# Patient Record
Sex: MALE | Race: WHITE | NOT HISPANIC OR LATINO | Employment: OTHER
[De-identification: names, ages, dates, MRNs, and addresses within clinical notes are randomized per-mention and may not be internally consistent; named-entity substitution may affect disease eponyms.]

---

## 2017-05-10 ENCOUNTER — RECORDS - HEALTHEAST (OUTPATIENT)
Dept: ADMINISTRATIVE | Facility: OTHER | Age: 77
End: 2017-05-10

## 2017-06-09 ENCOUNTER — COMMUNICATION - HEALTHEAST (OUTPATIENT)
Dept: SCHEDULING | Facility: CLINIC | Age: 77
End: 2017-06-09

## 2017-06-09 DIAGNOSIS — E78.5 HYPERLIPIDEMIA: ICD-10-CM

## 2017-07-03 ENCOUNTER — COMMUNICATION - HEALTHEAST (OUTPATIENT)
Dept: INTERNAL MEDICINE | Facility: CLINIC | Age: 77
End: 2017-07-03

## 2017-07-03 DIAGNOSIS — I25.10 CAD (CORONARY ARTERY DISEASE): ICD-10-CM

## 2017-09-30 ENCOUNTER — COMMUNICATION - HEALTHEAST (OUTPATIENT)
Dept: INTERNAL MEDICINE | Facility: CLINIC | Age: 77
End: 2017-09-30

## 2017-09-30 DIAGNOSIS — I25.10 CAD (CORONARY ARTERY DISEASE): ICD-10-CM

## 2017-10-10 ENCOUNTER — OFFICE VISIT - HEALTHEAST (OUTPATIENT)
Dept: INTERNAL MEDICINE | Facility: CLINIC | Age: 77
End: 2017-10-10

## 2017-10-10 DIAGNOSIS — G25.81 RESTLESS LEGS SYNDROME (RLS): ICD-10-CM

## 2017-10-10 DIAGNOSIS — G20.A1 PARALYSIS AGITANS (H): ICD-10-CM

## 2017-10-10 DIAGNOSIS — Z00.00 HEALTHCARE MAINTENANCE: ICD-10-CM

## 2017-10-10 DIAGNOSIS — I25.10 ATHEROSCLEROSIS OF NATIVE CORONARY ARTERY OF NATIVE HEART WITHOUT ANGINA PECTORIS: ICD-10-CM

## 2017-10-10 LAB
CHOLEST SERPL-MCNC: 158 MG/DL
HDLC SERPL-MCNC: 59 MG/DL
LDLC SERPL CALC-MCNC: 74 MG/DL
TRIGL SERPL-MCNC: 123 MG/DL

## 2017-10-10 RX ORDER — ROPINIROLE 0.5 MG/1
0.5-1 TABLET, FILM COATED ORAL
Status: SHIPPED | COMMUNITY
Start: 2017-07-18 | End: 2024-02-21

## 2017-10-10 ASSESSMENT — MIFFLIN-ST. JEOR: SCORE: 1588.27

## 2017-11-27 ENCOUNTER — RECORDS - HEALTHEAST (OUTPATIENT)
Dept: ADMINISTRATIVE | Facility: OTHER | Age: 77
End: 2017-11-27

## 2017-12-30 ENCOUNTER — COMMUNICATION - HEALTHEAST (OUTPATIENT)
Dept: INTERNAL MEDICINE | Facility: CLINIC | Age: 77
End: 2017-12-30

## 2017-12-30 DIAGNOSIS — I25.10 CAD (CORONARY ARTERY DISEASE): ICD-10-CM

## 2018-04-02 ENCOUNTER — COMMUNICATION - HEALTHEAST (OUTPATIENT)
Dept: INTERNAL MEDICINE | Facility: CLINIC | Age: 78
End: 2018-04-02

## 2018-04-02 DIAGNOSIS — I25.10 CAD (CORONARY ARTERY DISEASE): ICD-10-CM

## 2018-07-06 ENCOUNTER — RECORDS - HEALTHEAST (OUTPATIENT)
Dept: ADMINISTRATIVE | Facility: OTHER | Age: 78
End: 2018-07-06

## 2018-07-16 ENCOUNTER — RECORDS - HEALTHEAST (OUTPATIENT)
Dept: ADMINISTRATIVE | Facility: OTHER | Age: 78
End: 2018-07-16

## 2018-08-26 ENCOUNTER — RECORDS - HEALTHEAST (OUTPATIENT)
Dept: ADMINISTRATIVE | Facility: OTHER | Age: 78
End: 2018-08-26

## 2018-08-28 ENCOUNTER — COMMUNICATION - HEALTHEAST (OUTPATIENT)
Dept: INTERNAL MEDICINE | Facility: CLINIC | Age: 78
End: 2018-08-28

## 2018-09-07 ENCOUNTER — COMMUNICATION - HEALTHEAST (OUTPATIENT)
Dept: SCHEDULING | Facility: CLINIC | Age: 78
End: 2018-09-07

## 2018-09-23 ENCOUNTER — COMMUNICATION - HEALTHEAST (OUTPATIENT)
Dept: INTERNAL MEDICINE | Facility: CLINIC | Age: 78
End: 2018-09-23

## 2018-09-23 DIAGNOSIS — I25.10 CAD (CORONARY ARTERY DISEASE): ICD-10-CM

## 2018-09-23 DIAGNOSIS — E78.5 HYPERLIPIDEMIA: ICD-10-CM

## 2018-10-28 ENCOUNTER — COMMUNICATION - HEALTHEAST (OUTPATIENT)
Dept: INTERNAL MEDICINE | Facility: CLINIC | Age: 78
End: 2018-10-28

## 2018-11-10 ENCOUNTER — RECORDS - HEALTHEAST (OUTPATIENT)
Dept: ADMINISTRATIVE | Facility: OTHER | Age: 78
End: 2018-11-10

## 2018-12-16 ENCOUNTER — COMMUNICATION - HEALTHEAST (OUTPATIENT)
Dept: INTERNAL MEDICINE | Facility: CLINIC | Age: 78
End: 2018-12-16

## 2018-12-17 ENCOUNTER — COMMUNICATION - HEALTHEAST (OUTPATIENT)
Dept: INTERNAL MEDICINE | Facility: CLINIC | Age: 78
End: 2018-12-17

## 2018-12-17 DIAGNOSIS — I25.10 CAD (CORONARY ARTERY DISEASE): ICD-10-CM

## 2018-12-30 ENCOUNTER — COMMUNICATION - HEALTHEAST (OUTPATIENT)
Dept: INTERNAL MEDICINE | Facility: CLINIC | Age: 78
End: 2018-12-30

## 2018-12-30 DIAGNOSIS — E78.5 HYPERLIPIDEMIA: ICD-10-CM

## 2019-01-20 ENCOUNTER — COMMUNICATION - HEALTHEAST (OUTPATIENT)
Dept: INTERNAL MEDICINE | Facility: CLINIC | Age: 79
End: 2019-01-20

## 2019-03-24 ENCOUNTER — COMMUNICATION - HEALTHEAST (OUTPATIENT)
Dept: INTERNAL MEDICINE | Facility: CLINIC | Age: 79
End: 2019-03-24

## 2019-03-24 DIAGNOSIS — I25.10 CAD (CORONARY ARTERY DISEASE): ICD-10-CM

## 2019-06-22 ENCOUNTER — COMMUNICATION - HEALTHEAST (OUTPATIENT)
Dept: INTERNAL MEDICINE | Facility: CLINIC | Age: 79
End: 2019-06-22

## 2019-06-22 DIAGNOSIS — I25.10 CAD (CORONARY ARTERY DISEASE): ICD-10-CM

## 2019-06-28 ENCOUNTER — COMMUNICATION - HEALTHEAST (OUTPATIENT)
Dept: INTERNAL MEDICINE | Facility: CLINIC | Age: 79
End: 2019-06-28

## 2019-06-28 DIAGNOSIS — I25.10 CAD (CORONARY ARTERY DISEASE): ICD-10-CM

## 2019-07-03 ENCOUNTER — RECORDS - HEALTHEAST (OUTPATIENT)
Dept: ADMINISTRATIVE | Facility: OTHER | Age: 79
End: 2019-07-03

## 2019-09-22 ENCOUNTER — COMMUNICATION - HEALTHEAST (OUTPATIENT)
Dept: INTERNAL MEDICINE | Facility: CLINIC | Age: 79
End: 2019-09-22

## 2019-09-22 DIAGNOSIS — I25.10 CAD (CORONARY ARTERY DISEASE): ICD-10-CM

## 2019-12-15 ENCOUNTER — COMMUNICATION - HEALTHEAST (OUTPATIENT)
Dept: INTERNAL MEDICINE | Facility: CLINIC | Age: 79
End: 2019-12-15

## 2019-12-15 DIAGNOSIS — E78.5 HYPERLIPIDEMIA: ICD-10-CM

## 2019-12-15 DIAGNOSIS — I25.10 CAD (CORONARY ARTERY DISEASE): ICD-10-CM

## 2020-01-24 ENCOUNTER — COMMUNICATION - HEALTHEAST (OUTPATIENT)
Dept: INTERNAL MEDICINE | Facility: CLINIC | Age: 80
End: 2020-01-24

## 2020-01-24 DIAGNOSIS — I48.92 ATRIAL FLUTTER, UNSPECIFIED TYPE (H): ICD-10-CM

## 2020-03-22 ENCOUNTER — COMMUNICATION - HEALTHEAST (OUTPATIENT)
Dept: INTERNAL MEDICINE | Facility: CLINIC | Age: 80
End: 2020-03-22

## 2020-03-22 DIAGNOSIS — I25.10 CAD (CORONARY ARTERY DISEASE): ICD-10-CM

## 2020-03-23 ENCOUNTER — COMMUNICATION - HEALTHEAST (OUTPATIENT)
Dept: INTERNAL MEDICINE | Facility: CLINIC | Age: 80
End: 2020-03-23

## 2020-03-23 DIAGNOSIS — E78.5 HYPERLIPIDEMIA: ICD-10-CM

## 2020-06-18 ENCOUNTER — COMMUNICATION - HEALTHEAST (OUTPATIENT)
Dept: INTERNAL MEDICINE | Facility: CLINIC | Age: 80
End: 2020-06-18

## 2020-06-18 DIAGNOSIS — I25.10 CAD (CORONARY ARTERY DISEASE): ICD-10-CM

## 2020-06-30 ENCOUNTER — COMMUNICATION - HEALTHEAST (OUTPATIENT)
Dept: INTERNAL MEDICINE | Facility: CLINIC | Age: 80
End: 2020-06-30

## 2020-06-30 DIAGNOSIS — I25.10 CAD (CORONARY ARTERY DISEASE): ICD-10-CM

## 2020-07-23 ENCOUNTER — RECORDS - HEALTHEAST (OUTPATIENT)
Dept: ADMINISTRATIVE | Facility: OTHER | Age: 80
End: 2020-07-23

## 2020-08-06 ENCOUNTER — RECORDS - HEALTHEAST (OUTPATIENT)
Dept: ADMINISTRATIVE | Facility: OTHER | Age: 80
End: 2020-08-06

## 2020-09-08 ENCOUNTER — COMMUNICATION - HEALTHEAST (OUTPATIENT)
Dept: INTERNAL MEDICINE | Facility: CLINIC | Age: 80
End: 2020-09-08

## 2020-09-10 ENCOUNTER — OFFICE VISIT - HEALTHEAST (OUTPATIENT)
Dept: INTERNAL MEDICINE | Facility: CLINIC | Age: 80
End: 2020-09-10

## 2020-09-10 DIAGNOSIS — I48.3 TYPICAL ATRIAL FLUTTER (H): ICD-10-CM

## 2020-09-10 DIAGNOSIS — M25.551 HIP PAIN, RIGHT: ICD-10-CM

## 2020-09-10 DIAGNOSIS — G25.81 RESTLESS LEGS SYNDROME (RLS): ICD-10-CM

## 2020-09-10 DIAGNOSIS — Z95.0 CARDIAC PACEMAKER IN SITU: ICD-10-CM

## 2020-09-10 DIAGNOSIS — G20.A1 PARKINSONS DISEASE (H): ICD-10-CM

## 2020-09-10 DIAGNOSIS — I48.0 PAROXYSMAL ATRIAL FIBRILLATION (H): ICD-10-CM

## 2020-09-11 ENCOUNTER — RECORDS - HEALTHEAST (OUTPATIENT)
Dept: ADMINISTRATIVE | Facility: OTHER | Age: 80
End: 2020-09-11

## 2020-09-11 ENCOUNTER — COMMUNICATION - HEALTHEAST (OUTPATIENT)
Dept: INTERNAL MEDICINE | Facility: CLINIC | Age: 80
End: 2020-09-11

## 2020-09-16 ENCOUNTER — COMMUNICATION - HEALTHEAST (OUTPATIENT)
Dept: INTERNAL MEDICINE | Facility: CLINIC | Age: 80
End: 2020-09-16

## 2020-09-16 DIAGNOSIS — I25.10 CAD (CORONARY ARTERY DISEASE): ICD-10-CM

## 2020-09-24 ENCOUNTER — COMMUNICATION - HEALTHEAST (OUTPATIENT)
Dept: INTERNAL MEDICINE | Facility: CLINIC | Age: 80
End: 2020-09-24

## 2020-09-24 DIAGNOSIS — I25.10 CAD (CORONARY ARTERY DISEASE): ICD-10-CM

## 2020-09-25 ENCOUNTER — COMMUNICATION - HEALTHEAST (OUTPATIENT)
Dept: INTERNAL MEDICINE | Facility: CLINIC | Age: 80
End: 2020-09-25

## 2020-09-25 DIAGNOSIS — M25.551 HIP PAIN, RIGHT: ICD-10-CM

## 2020-09-30 ENCOUNTER — RECORDS - HEALTHEAST (OUTPATIENT)
Dept: ADMINISTRATIVE | Facility: OTHER | Age: 80
End: 2020-09-30

## 2020-11-13 ENCOUNTER — RECORDS - HEALTHEAST (OUTPATIENT)
Dept: ADMINISTRATIVE | Facility: OTHER | Age: 80
End: 2020-11-13

## 2020-12-11 ENCOUNTER — RECORDS - HEALTHEAST (OUTPATIENT)
Dept: ADMINISTRATIVE | Facility: OTHER | Age: 80
End: 2020-12-11

## 2020-12-18 ENCOUNTER — COMMUNICATION - HEALTHEAST (OUTPATIENT)
Dept: INTERNAL MEDICINE | Facility: CLINIC | Age: 80
End: 2020-12-18

## 2020-12-18 DIAGNOSIS — I25.10 CAD (CORONARY ARTERY DISEASE): ICD-10-CM

## 2020-12-18 DIAGNOSIS — E78.5 HYPERLIPIDEMIA: ICD-10-CM

## 2020-12-18 RX ORDER — SIMVASTATIN 10 MG
TABLET ORAL
Qty: 90 TABLET | Refills: 3 | Status: SHIPPED | OUTPATIENT
Start: 2020-12-18 | End: 2021-12-14

## 2020-12-23 ENCOUNTER — COMMUNICATION - HEALTHEAST (OUTPATIENT)
Dept: INTERNAL MEDICINE | Facility: CLINIC | Age: 80
End: 2020-12-23

## 2020-12-23 DIAGNOSIS — I48.92 ATRIAL FLUTTER, UNSPECIFIED TYPE (H): ICD-10-CM

## 2020-12-23 DIAGNOSIS — I25.10 CAD (CORONARY ARTERY DISEASE): ICD-10-CM

## 2020-12-23 RX ORDER — SOTALOL HYDROCHLORIDE 80 MG/1
TABLET ORAL
Qty: 90 TABLET | Refills: 3 | Status: SHIPPED | OUTPATIENT
Start: 2020-12-23 | End: 2022-01-24

## 2020-12-28 ENCOUNTER — COMMUNICATION - HEALTHEAST (OUTPATIENT)
Dept: INTERNAL MEDICINE | Facility: CLINIC | Age: 80
End: 2020-12-28

## 2020-12-28 DIAGNOSIS — I25.10 CAD (CORONARY ARTERY DISEASE): ICD-10-CM

## 2020-12-30 ENCOUNTER — COMMUNICATION - HEALTHEAST (OUTPATIENT)
Dept: INTERNAL MEDICINE | Facility: CLINIC | Age: 80
End: 2020-12-30

## 2020-12-30 DIAGNOSIS — I25.10 CAD (CORONARY ARTERY DISEASE): ICD-10-CM

## 2021-01-07 ENCOUNTER — COMMUNICATION - HEALTHEAST (OUTPATIENT)
Dept: INTERNAL MEDICINE | Facility: CLINIC | Age: 81
End: 2021-01-07

## 2021-01-07 DIAGNOSIS — I25.10 CAD (CORONARY ARTERY DISEASE): ICD-10-CM

## 2021-01-07 RX ORDER — DOXYCYCLINE HYCLATE 20 MG
20 TABLET ORAL DAILY
Qty: 90 TABLET | Refills: 3 | Status: SHIPPED | OUTPATIENT
Start: 2021-01-07 | End: 2022-03-28

## 2021-01-11 ENCOUNTER — RECORDS - HEALTHEAST (OUTPATIENT)
Dept: ADMINISTRATIVE | Facility: OTHER | Age: 81
End: 2021-01-11

## 2021-02-19 ENCOUNTER — AMBULATORY - HEALTHEAST (OUTPATIENT)
Dept: NURSING | Facility: CLINIC | Age: 81
End: 2021-02-19

## 2021-02-22 ENCOUNTER — RECORDS - HEALTHEAST (OUTPATIENT)
Dept: ADMINISTRATIVE | Facility: OTHER | Age: 81
End: 2021-02-22

## 2021-03-12 ENCOUNTER — AMBULATORY - HEALTHEAST (OUTPATIENT)
Dept: NURSING | Facility: CLINIC | Age: 81
End: 2021-03-12

## 2021-03-15 ENCOUNTER — RECORDS - HEALTHEAST (OUTPATIENT)
Dept: ADMINISTRATIVE | Facility: OTHER | Age: 81
End: 2021-03-15

## 2021-03-17 ENCOUNTER — RECORDS - HEALTHEAST (OUTPATIENT)
Dept: ADMINISTRATIVE | Facility: OTHER | Age: 81
End: 2021-03-17

## 2021-05-26 ENCOUNTER — RECORDS - HEALTHEAST (OUTPATIENT)
Dept: ADMINISTRATIVE | Facility: OTHER | Age: 81
End: 2021-05-26

## 2021-05-27 NOTE — TELEPHONE ENCOUNTER
RN cannot approve Refill Request    RN can NOT refill this medication med is not covered by policy/route to provider.       Kaylie Siddiqui, [facility] Triage/Med Refill 3/25/2019    Requested Prescriptions   Pending Prescriptions Disp Refills     XARELTO 20 mg Tab [Pharmacy Med Name: XARELTO 20MG TABLETS] 90 tablet 0     Sig: TAKE 1 TABLET(20 MG) BY MOUTH DAILY WITH SUPPER    Apixaban/Rivaroxaban/Dabigatran Refill Protocol Failed - 3/24/2019  8:43 PM       Failed - Renal function test in last year    Creatinine   Date Value Ref Range Status   10/10/2017 1.09 0.70 - 1.30 mg/dL Final            Failed - PCP or prescribing provider visit in past 12 months      Last office visit with prescriber/PCP: 10/10/2017 Sherman Avelar MD OR same dept: Visit date not found OR same specialty: 10/10/2017 Sherman Avelar MD  Last physical: Visit date not found Last MTM visit: Visit date not found   Next visit within 3 mo: Visit date not found  Next physical within 3 mo: Visit date not found  Prescriber OR PCP: Sherman Avelar MD  Last diagnosis associated with med order: 1. CAD (coronary artery disease)  - doxycycline (PERIOSTAT) 20 MG tablet [Pharmacy Med Name: DOXYCYCLINE HYCLATE 20MG TABLETS]; TAKE 1 TABLET BY MOUTH EVERY DAY  Dispense: 90 tablet; Refill: 0    If protocol passes may refill for 12 months if within 3 months of last provider visit (or a total of 15 months).            Failed - Route to appropriate pool/provider    Last Anticoagulation Summary:           doxycycline (PERIOSTAT) 20 MG tablet [Pharmacy Med Name: DOXYCYCLINE HYCLATE 20MG TABLETS] 90 tablet 0     Sig: TAKE 1 TABLET BY MOUTH EVERY DAY    There is no refill protocol information for this order

## 2021-05-28 ENCOUNTER — RECORDS - HEALTHEAST (OUTPATIENT)
Dept: ADMINISTRATIVE | Facility: CLINIC | Age: 81
End: 2021-05-28

## 2021-05-29 NOTE — TELEPHONE ENCOUNTER
RN cannot approve Refill Request    RN can NOT refill this medication med is not covered by policy/route to provider. Last office visit: 10/10/2017 Sherman Avelar MD Last Physical: Visit date not found Last MTM visit: Visit date not found Last visit same specialty: 10/10/2017 Sherman Avelar MD.  Next visit within 3 mo: Visit date not found  Next physical within 3 mo: Visit date not found      Niya Rios, [facility] Triage/Med Refill 6/23/2019    Requested Prescriptions   Pending Prescriptions Disp Refills     doxycycline (PERIOSTAT) 20 MG tablet [Pharmacy Med Name: DOXYCYCLINE HYCLATE 20MG TABLETS] 90 tablet 0     Sig: TAKE 1 TABLET BY MOUTH EVERY DAY       There is no refill protocol information for this order

## 2021-05-30 NOTE — TELEPHONE ENCOUNTER
RN cannot approve Refill Request    RN can NOT refill this medication Protocol failed and NO refill given. Last office visit: 10/10/2017 Sherman Avelar MD Last Physical: Visit date not found Last MTM visit: Visit date not found Last visit same specialty: 10/10/2017 Sherman Avelar MD.  Next visit within 3 mo: Visit date not found  Next physical within 3 mo: Visit date not found      Shanti Hampton, [facility] Triage/Med Refill 6/29/2019    Requested Prescriptions   Pending Prescriptions Disp Refills     XARELTO 20 mg tablet [Pharmacy Med Name: XARELTO 20MG TABLETS] 90 tablet 0     Sig: TAKE 1 TABLET(20 MG) BY MOUTH DAILY WITH SUPPER       Apixaban/Rivaroxaban/Dabigatran Refill Protocol Failed - 6/28/2019  6:41 PM        Failed - Renal function test in last year     Creatinine   Date Value Ref Range Status   10/10/2017 1.09 0.70 - 1.30 mg/dL Final             Failed - PCP or prescribing provider visit in past 12 months       Last office visit with prescriber/PCP: 10/10/2017 Sherman Avelar MD OR same dept: Visit date not found OR same specialty: 10/10/2017 Sherman Avelar MD  Last physical: Visit date not found Last MTM visit: Visit date not found   Next visit within 3 mo: Visit date not found  Next physical within 3 mo: Visit date not found  Prescriber OR PCP: Sherman Avelar MD  Last diagnosis associated with med order: 1. CAD (coronary artery disease)  - XARELTO 20 mg tablet [Pharmacy Med Name: XARELTO 20MG TABLETS]; TAKE 1 TABLET(20 MG) BY MOUTH DAILY WITH SUPPER  Dispense: 90 tablet; Refill: 0    If protocol passes may refill for 12 months if within 3 months of last provider visit (or a total of 15 months).             Failed - Route to appropriate pool/provider     Last Anticoagulation Summary:

## 2021-05-31 ENCOUNTER — RECORDS - HEALTHEAST (OUTPATIENT)
Dept: ADMINISTRATIVE | Facility: CLINIC | Age: 81
End: 2021-05-31

## 2021-05-31 VITALS — HEIGHT: 71 IN | WEIGHT: 192.7 LBS | BODY MASS INDEX: 26.98 KG/M2

## 2021-06-01 NOTE — TELEPHONE ENCOUNTER
Refill NOT Given  Refill NOT Given> 15 months since last office visit  Refill given per Policy, patient informed they are overdue for Labs and Office Visit   OV 10/10/17    Jyothi Longoria, [facility] Triage/Med Refill 9/22/2019    Requested Prescriptions   Pending Prescriptions Disp Refills     XARELTO 20 mg tablet [Pharmacy Med Name: XARELTO 20MG TABLETS] 90 tablet 0     Sig: TAKE 1 TABLET(20 MG) BY MOUTH DAILY WITH SUPPER       Apixaban/Rivaroxaban/Dabigatran Refill Protocol Failed - 9/22/2019  9:04 PM        Failed - Renal function test in last year     Creatinine   Date Value Ref Range Status   10/10/2017 1.09 0.70 - 1.30 mg/dL Final             Failed - PCP or prescribing provider visit in past 12 months       Last office visit with prescriber/PCP: 10/10/2017 Sherman Avelar MD OR same dept: Visit date not found OR same specialty: 10/10/2017 Sherman Avelar MD  Last physical: Visit date not found Last MTM visit: Visit date not found   Next visit within 3 mo: Visit date not found  Next physical within 3 mo: Visit date not found  Prescriber OR PCP: Sherman Avelar MD  Last diagnosis associated with med order: 1. CAD (coronary artery disease)  - XARELTO 20 mg tablet [Pharmacy Med Name: XARELTO 20MG TABLETS]; TAKE 1 TABLET(20 MG) BY MOUTH DAILY WITH SUPPER  Dispense: 90 tablet; Refill: 0  - doxycycline (PERIOSTAT) 20 MG tablet [Pharmacy Med Name: DOXYCYCLINE HYCLATE 20MG TABLETS]; TAKE 1 TABLET BY MOUTH EVERY DAY  Dispense: 90 tablet; Refill: 0    If protocol passes may refill for 12 months if within 3 months of last provider visit (or a total of 15 months).             Failed - Route to appropriate pool/provider     Last Anticoagulation Summary:           doxycycline (PERIOSTAT) 20 MG tablet [Pharmacy Med Name: DOXYCYCLINE HYCLATE 20MG TABLETS] 90 tablet 0     Sig: TAKE 1 TABLET BY MOUTH EVERY DAY       There is no refill protocol information for this order

## 2021-06-04 NOTE — TELEPHONE ENCOUNTER
RN cannot approve Refill Request    RN can NOT refill this medication PCP messaged that patient is overdue for Office Visit. Last office visit: 10/10/2017 Sherman Avelar MD Last Physical: Visit date not found Last MTM visit: Visit date not found Last visit same specialty: 10/10/2017 Sherman Avelar MD.  Next visit within 3 mo: Visit date not found  Next physical within 3 mo: Visit date not found      Gabi Haynes, [facility] Triage/Med Refill 12/16/2019    Requested Prescriptions   Pending Prescriptions Disp Refills     XARELTO 20 mg tablet [Pharmacy Med Name: XARELTO 20MG TABLETS] 90 tablet 0     Sig: TAKE 1 TABLET(20 MG) BY MOUTH DAILY WITH SUPPER       Apixaban/Rivaroxaban/Dabigatran Refill Protocol Failed - 12/15/2019  7:12 PM        Failed - Renal function test in last year     Creatinine   Date Value Ref Range Status   10/10/2017 1.09 0.70 - 1.30 mg/dL Final             Failed - PCP or prescribing provider visit in past 12 months       Last office visit with prescriber/PCP: 10/10/2017 Sherman Avelar MD OR same dept: Visit date not found OR same specialty: 10/10/2017 Sherman Avelar MD  Last physical: Visit date not found Last MTM visit: Visit date not found   Next visit within 3 mo: Visit date not found  Next physical within 3 mo: Visit date not found  Prescriber OR PCP: Sherman Avelar MD  Last diagnosis associated with med order: 1. CAD (coronary artery disease)  - XARELTO 20 mg tablet [Pharmacy Med Name: XARELTO 20MG TABLETS]; TAKE 1 TABLET(20 MG) BY MOUTH DAILY WITH SUPPER  Dispense: 90 tablet; Refill: 0  - doxycycline (PERIOSTAT) 20 MG tablet [Pharmacy Med Name: DOXYCYCLINE HYCLATE 20MG TABLETS]; TAKE 1 TABLET BY MOUTH EVERY DAY  Dispense: 90 tablet; Refill: 0    2. Hyperlipidemia  - simvastatin (ZOCOR) 10 MG tablet [Pharmacy Med Name: SIMVASTATIN 10MG TABLETS]; TAKE 1 TABLET(10 MG) BY MOUTH AT BEDTIME  Dispense: 90 tablet; Refill: 0    If protocol passes may refill for  12 months if within 3 months of last provider visit (or a total of 15 months).             Failed - Route to appropriate pool/provider     Last Anticoagulation Summary:           doxycycline (PERIOSTAT) 20 MG tablet [Pharmacy Med Name: DOXYCYCLINE HYCLATE 20MG TABLETS] 90 tablet 0     Sig: TAKE 1 TABLET BY MOUTH EVERY DAY       There is no refill protocol information for this order        simvastatin (ZOCOR) 10 MG tablet [Pharmacy Med Name: SIMVASTATIN 10MG TABLETS] 90 tablet 0     Sig: TAKE 1 TABLET(10 MG) BY MOUTH AT BEDTIME       Statins Refill Protocol (Hmg CoA Reductase Inhibitors) Failed - 12/15/2019  7:12 PM        Failed - PCP or prescribing provider visit in past 12 months      Last office visit with prescriber/PCP: 10/10/2017 Sherman Avelar MD OR same dept: Visit date not found OR same specialty: 10/10/2017 Sherman Avelar MD  Last physical: Visit date not found Last MTM visit: Visit date not found   Next visit within 3 mo: Visit date not found  Next physical within 3 mo: Visit date not found  Prescriber OR PCP: Sherman Avelar MD  Last diagnosis associated with med order: 1. CAD (coronary artery disease)  - XARELTO 20 mg tablet [Pharmacy Med Name: XARELTO 20MG TABLETS]; TAKE 1 TABLET(20 MG) BY MOUTH DAILY WITH SUPPER  Dispense: 90 tablet; Refill: 0  - doxycycline (PERIOSTAT) 20 MG tablet [Pharmacy Med Name: DOXYCYCLINE HYCLATE 20MG TABLETS]; TAKE 1 TABLET BY MOUTH EVERY DAY  Dispense: 90 tablet; Refill: 0    2. Hyperlipidemia  - simvastatin (ZOCOR) 10 MG tablet [Pharmacy Med Name: SIMVASTATIN 10MG TABLETS]; TAKE 1 TABLET(10 MG) BY MOUTH AT BEDTIME  Dispense: 90 tablet; Refill: 0    If protocol passes may refill for 12 months if within 3 months of last provider visit (or a total of 15 months).

## 2021-06-07 NOTE — TELEPHONE ENCOUNTER
RN cannot approve Refill Request    RN can NOT refill this medication Protocol failed and NO refill given.      Kaylie Siddiqui, [facility] Triage/Med Refill 3/24/2020    Requested Prescriptions   Pending Prescriptions Disp Refills     simvastatin (ZOCOR) 10 MG tablet [Pharmacy Med Name: SIMVASTATIN 10MG TABLETS] 90 tablet 3     Sig: TAKE 1 TABLET(10 MG) BY MOUTH AT BEDTIME       Statins Refill Protocol (Hmg CoA Reductase Inhibitors) Failed - 3/23/2020  8:10 AM        Failed - PCP or prescribing provider visit in past 12 months      Last office visit with prescriber/PCP: 10/10/2017 Sherman Avelar MD OR same dept: Visit date not found OR same specialty: 10/10/2017 Sherman Avelar MD  Last physical: Visit date not found Last MTM visit: Visit date not found   Next visit within 3 mo: Visit date not found  Next physical within 3 mo: Visit date not found  Prescriber OR PCP: Sherman Avelar MD  Last diagnosis associated with med order: 1. Hyperlipidemia  - simvastatin (ZOCOR) 10 MG tablet [Pharmacy Med Name: SIMVASTATIN 10MG TABLETS]; TAKE 1 TABLET(10 MG) BY MOUTH AT BEDTIME  Dispense: 90 tablet; Refill: 0    If protocol passes may refill for 12 months if within 3 months of last provider visit (or a total of 15 months).

## 2021-06-07 NOTE — TELEPHONE ENCOUNTER
RN cannot approve Refill Request    RN can NOT refill this medication med is not covered by policy/route to provider     . Last office visit: 10/10/2017 Sherman Avelar MD Last Physical: Visit date not found Last MTM visit: Visit date not found Last visit same specialty: 10/10/2017 Sherman Avelar MD.  Next visit within 3 mo: Visit date not found  Next physical within 3 mo: Visit date not found      Kaylie iSddiqui, [facility] Triage/Med Refill 3/23/2020    Requested Prescriptions   Pending Prescriptions Disp Refills     doxycycline (PERIOSTAT) 20 MG tablet [Pharmacy Med Name: DOXYCYCLINE HYCLATE 20MG TABLETS] 90 tablet 0     Sig: TAKE 1 TABLET BY MOUTH EVERY DAY       There is no refill protocol information for this order

## 2021-06-09 NOTE — TELEPHONE ENCOUNTER
RN cannot approve Refill Request    RN can NOT refill this medication Protocol failed and NO refill given.     Kaylie Siddiqui, [facility] Triage/Med Refill 7/1/2020    Requested Prescriptions   Pending Prescriptions Disp Refills     XARELTO 20 mg tablet [Pharmacy Med Name: XARELTO 20MG TABLETS] 90 tablet 0     Sig: TAKE 1 TABLET(20 MG) BY MOUTH DAILY WITH SUPPER       Apixaban/Rivaroxaban/Dabigatran Refill Protocol Failed - 6/30/2020 12:51 PM        Failed - Renal function test in last year     Creatinine   Date Value Ref Range Status   10/10/2017 1.09 0.70 - 1.30 mg/dL Final             Failed - PCP or prescribing provider visit in past 12 months       Last office visit with prescriber/PCP: 10/10/2017 Sherman Avelar MD OR same dept: Visit date not found OR same specialty: 10/10/2017 Sherman Avelar MD  Last physical: Visit date not found Last MTM visit: Visit date not found   Next visit within 3 mo: Visit date not found  Next physical within 3 mo: Visit date not found  Prescriber OR PCP: Sherman Avelar MD  Last diagnosis associated with med order: 1. CAD (coronary artery disease)  - XARELTO 20 mg tablet [Pharmacy Med Name: XARELTO 20MG TABLETS]; TAKE 1 TABLET(20 MG) BY MOUTH DAILY WITH SUPPER  Dispense: 90 tablet; Refill: 0    If protocol passes may refill for 12 months if within 3 months of last provider visit (or a total of 15 months).             Failed - Route to appropriate pool/provider     Last Anticoagulation Summary:

## 2021-06-09 NOTE — TELEPHONE ENCOUNTER
RN cannot approve Refill Request    RN can NOT refill this medication med is not covered by policy/route to provider       . Last office visit: 10/10/2017 Sherman Avelar MD Last Physical: Visit date not found Last MTM visit: Visit date not found Last visit same specialty: 10/10/2017 Sherman Avelar MD.  Next visit within 3 mo: Visit date not found  Next physical within 3 mo: Visit date not found      Kaylie Siddiqui, [facility] Triage/Med Refill 6/18/2020    Requested Prescriptions   Pending Prescriptions Disp Refills     doxycycline (PERIOSTAT) 20 MG tablet [Pharmacy Med Name: DOXYCYCLINE HYCLATE 20MG TABLETS] 90 tablet 0     Sig: TAKE 1 TABLET BY MOUTH EVERY DAY       There is no refill protocol information for this order

## 2021-06-11 NOTE — TELEPHONE ENCOUNTER
Referral Request  Type of referral: Ortho  Who s requesting: Patient  Why the request: Right hip pain. Pt prefers a referral in [place] of the MRI. Please advise.  Have you been seen for this request: Yes  Does patient have a preference on a group/provider? [facility]  Okay to leave a detailed message?  Yes

## 2021-06-11 NOTE — TELEPHONE ENCOUNTER
RN cannot approve Refill Request    RN can NOT refill this medication med is not covered by policy/route to provider. Last office visit: 10/10/2017 Sherman Avelar MD Last Physical: Visit date not found Last MTM visit: Visit date not found Last visit same specialty: 10/10/2017 Sherman Avelar MD.  Next visit within 3 mo: Visit date not found  Next physical within 3 mo: Visit date not found      Kaylie Siddiqui, [facility] Triage/Med Refill 9/17/2020    Requested Prescriptions   Pending Prescriptions Disp Refills     doxycycline (PERIOSTAT) 20 MG tablet [Pharmacy Med Name: DOXYCYCLINE HYC 20MG TABS] 90 tablet 0     Sig: TAKE 1 TABLET BY MOUTH EVERY DAY       There is no refill protocol information for this order

## 2021-06-11 NOTE — TELEPHONE ENCOUNTER
Orders being requested: MRI Right hip  Reason service is needed/diagnosis: Pain and disfunction  Patient wants MRI to see if need ortho referral.  Please advise.   When are orders needed by: asap  Where to send Orders: in chart  Okay to leave detailed message?  Yes

## 2021-06-11 NOTE — TELEPHONE ENCOUNTER
RN cannot approve Refill Request    RN can NOT refill this medication med is not covered by policy/route to provider. Last office visit: 10/10/2017 Sherman Avelar MD Last Physical: Visit date not found Last MTM visit: Visit date not found Last visit same specialty: 10/10/2017 Sherman Avelar MD.  Next visit within 3 mo: Visit date not found  Next physical within 3 mo: Visit date not found      Niya Rios, [facility] Triage/Med Refill 9/27/2020    Requested Prescriptions   Pending Prescriptions Disp Refills     XARELTO 20 mg tablet [Pharmacy Med Name: XARELTO 20MG TABLETS] 90 tablet 0     Sig: TAKE 1 TABLET(20 MG) BY MOUTH DAILY WITH SUPPER       Apixaban/Rivaroxaban/Dabigatran Refill Protocol Failed - 9/24/2020  8:54 PM        Failed - Renal function test in last year     Creatinine   Date Value Ref Range Status   10/10/2017 1.09 0.70 - 1.30 mg/dL Final             Failed - Route to appropriate pool/provider     Last Anticoagulation Summary:           Passed - PCP or prescribing provider visit in past 12 months       Last office visit with prescriber/PCP: 10/10/2017 Sherman Avelar MD OR same dept: Visit date not found OR same specialty: 10/10/2017 Sherman Avelar MD  Last physical: Visit date not found Last MTM visit: Visit date not found   Next visit within 3 mo: Visit date not found  Next physical within 3 mo: Visit date not found  Prescriber OR PCP: Sherman Avelar MD  Last diagnosis associated with med order: 1. CAD (coronary artery disease)  - XARELTO 20 mg tablet [Pharmacy Med Name: XARELTO 20MG TABLETS]; TAKE 1 TABLET(20 MG) BY MOUTH DAILY WITH SUPPER  Dispense: 90 tablet; Refill: 0    If protocol passes may refill for 12 months if within 3 months of last provider visit (or a total of 15 months).                    none

## 2021-06-11 NOTE — TELEPHONE ENCOUNTER
FYI - Status Update  Who is Calling: Patient  Update: MRI has to be performed at [facility] with a [employer] agent present due to pace maker. [employer] tech stated they will be in contact with the Pt.  Okay to leave a detailed message?:  Yes

## 2021-06-11 NOTE — PROGRESS NOTES
Patient would like to be contacted via the following phone number [phone]     ASSESSMENT:  1. Hip pain, right  Discussed x-ray or lumbar radiation.  He would prefer to be aggressive and definitive due to duration and his activity.  Moved MRI.  Caution regarding pacemaker  - MR Hip Without Contrast Right; Future    2. Typical atrial flutter (H)  No recurrence status post ablation x2    3. Restless legs syndrome (RLS)  Tolerable with use of Requip and Sinemet    4. Paroxysmal atrial fibrillation (H)  Intermittent and asymptomatic by pacemaker.  Clarify taking anticoagulants through cardiology    5. Cardiac pacemaker in situ  Status post pacemaker placement June 12, 2015 after record review    6. Parkinsons disease (H)  Clarify taking 10 tablets daily with new neurologist  - carbidopa-levodopa (SINEMET CR)  mg ER tablet; Take 2 tablets by mouth 5 (five) times a day.; Refill: 0    Preventive Health Care: Recommend flu shot    PLAN:  Patient Instructions   MRI of right hip    Medication list readjusted    Call to check on pacemaker and MRI      No follow-ups on file.      CHIEF COMPLAINT:  Chief Complaint   Patient presents with     Hip Pain     Right hip - wants MRI       HISTORY OF PRESENT ILLNESS:  Dwight is a 79 y.o. male contacting the clinic today via phone for complaints of right hip pain.  He used to have difficulty with his left leg.  That is now normal.  He has no significant arthritis.  Right hip has been painful for 6 to 12 months.  He feels this most in his back but denies radiation to his leg.  It is painful when he moves his leg or tries to get into the car.  There is no pain laterally.  No injury.  Tylenol does not help    REVIEW OF SYSTEMS:   Controlled restless legs.  Asymptomatic A. fib.  Stable tremor.  Good sleep.  Good energy.  All other systems are negative.    PFSH:  Social History     Social History Narrative     to wife Karin        3 boys        Retired         Likes to  be physically active        Active in [employer]         TOBACCO USE:  Social History     Tobacco Use   Smoking Status Never Smoker       VITALS:  There were no vitals filed for this visit.  Wt Readings from Last 3 Encounters:   10/10/17 192 lb 11.2 oz (87.4 kg)   05/26/16 193 lb (87.5 kg)   02/05/15 184 lb 11.9 oz (83.8 kg)       PHYSICAL EXAM:  (observations via Phone)  Alert and oriented.  No tremor or stutter      ADDITIONAL HISTORY SUMMARIZED (2): Reviewed cardiology and neurology notes.  Reviewed pacemaker reports  CARE EVERYWHERE/ EXTRA INFORMATION (1):   Orders Placed This Encounter   Procedures     MR Hip Without Contrast Right     Standing Status:   Future     Standing Expiration Date:   9/10/2021     Order Specific Question:   Can the procedure be changed per Radiologist protocol?     Answer:   Yes     Order Specific Question:   If this is a diagnostic procedure, have the patient's age and recent imaging history been considered?     Answer:   Yes     Order Specific Question:   Is the patient claustrophobic and in need of sedation to complete their MR scan?     Answer:   No     RADIOLOGY TESTS (1): MRI ordered  LABS (1): Up-to-date through cardiology  CARDIOLOGY/MEDICINE TESTS (1): Intermittent A. fib by event device recorder  INDEPENDENT REVIEW (2 each):     Total data points: 5    Phone Start time: 10:47 AM  Phone End time: 11:02 AM    The visit lasted a total of 15 minutes     CA Intake Time: 4 min     I have reviewed and updated the patient's Past Medical History, Social History, Family History as appropriate.    MEDICATIONS: Reviewed and updated per CA and MD  Current Outpatient Medications   Medication Sig Dispense Refill     carbidopa-levodopa (SINEMET CR)  mg ER tablet Take 2 tablets by mouth 5 (five) times a day.  0     doxycycline (PERIOSTAT) 20 MG tablet TAKE 1 TABLET BY MOUTH EVERY DAY 90 tablet 0     rOPINIRole (REQUIP) 0.5 MG tablet Take 0.5-1 mg by mouth.       simvastatin  "(ZOCOR) 10 MG tablet TAKE 1 TABLET(10 MG) BY MOUTH AT BEDTIME 90 tablet 3     sotalol (BETAPACE) 80 MG tablet TAKE 1/2 TABLET(40 MG) BY MOUTH TWICE DAILY 90 tablet 3     XARELTO 20 mg tablet TAKE 1 TABLET(20 MG) BY MOUTH DAILY WITH SUPPER 90 tablet 0     No current facility-administered medications for this visit.        The patient has been notified of following:     \"This telephone visit will be conducted via a call between you and your physician/provider. We have found that certain health care needs can be provided without the need for a physical exam.  This service lets us provide the care you need with a short phone conversation.  If a prescription is necessary we can send it directly to your pharmacy.  If lab work is needed we can place an order for that and you can then stop by our lab to have the test done at a later time.    Telephone visits are billed at different rates depending on your insurance coverage. During this emergency period, for some insurers they may be billed the same as an in-person visit.  Please reach out to your insurance provider with any questions.    If during the course of the call the physician/provider feels a telephone visit is not appropriate, you will not be charged for this service.\"    Patient has given verbal consent to a Telephone visit? Yes    Patient would like to receive their AVS by AVS Preference: Kavon.    Sherman Avelar MD     "

## 2021-06-13 NOTE — TELEPHONE ENCOUNTER
RN cannot approve Refill Request    RN can NOT refill this medication med is not covered by policy/route to provider. Last office visit: 10/10/2017 Sherman Avelar MD Last Physical: Visit date not found Last MTM visit: Visit date not found Last visit same specialty: 10/10/2017 Sherman Avelar MD.  Next visit within 3 mo: Visit date not found  Next physical within 3 mo: Visit date not found      Niya Rios, [facility] Triage/Med Refill 12/19/2020    Requested Prescriptions   Pending Prescriptions Disp Refills     doxycycline (PERIOSTAT) 20 MG tablet [Pharmacy Med Name: DOXYCYCLINE HYC 20MG TABS] 90 tablet 0     Sig: TAKE 1 TABLET BY MOUTH EVERY DAY       There is no refill protocol information for this order

## 2021-06-14 NOTE — TELEPHONE ENCOUNTER
Requested Prescriptions     Pending Prescriptions Disp Refills     sotaloL (BETAPACE) 80 MG tablet 90 tablet 3     Last office visit:  9/10/20, no vitals checked at this appointment  Last refill:  1/27/20. #90 and 3 refills  Diagnosis:  Atrial flutter, unspecified type   Updated Flowsheet:  n/a  Current lab work: no    Documentation from 9/10/20 encounter:  Typical atrial flutter (H)  No recurrence status post ablation x2

## 2021-06-14 NOTE — TELEPHONE ENCOUNTER
Reason for Call:  Medication or medication refill:    Do you use a [facility]?  Name of the pharmacy and phone number for the current request: NEW PHARMACY:   [facility] -PHONE: [phone]  FAX:  [phone]    Name of the medication requested: DOXYCYCLINE 20MG #90 - 1 TAB DAILY    Other request: N/A    Can we leave a detailed message on this number? Yes    Phone number patient can be reached at:   Cell number on file:    Telephone Information:   Mobile [phone]       Best Time: ANYTIME    Call taken on 1/7/2021 at 11:52 AM by Rolanda Piper

## 2021-06-14 NOTE — TELEPHONE ENCOUNTER
RN cannot approve Refill Request    RN can NOT refill this medication med is not covered by policy/route to provider. Last office visit: 10/10/2017 Sherman Avelar MD Last Physical: Visit date not found Last MTM visit: Visit date not found Last visit same specialty: 10/10/2017 Sherman Avelar MD.  Next visit within 3 mo: Visit date not found  Next physical within 3 mo: Visit date not found      Niya Rios, [facility] Triage/Med Refill 12/24/2020    Requested Prescriptions   Pending Prescriptions Disp Refills     rivaroxaban ANTICOAGULANT (XARELTO) 20 mg tablet [Pharmacy Med Name: XARELTO 20MG TABLETS] 90 tablet 1     Sig: TAKE 1 TABLET(20 MG) BY MOUTH DAILY WITH SUPPER       Apixaban/Rivaroxaban/Dabigatran Refill Protocol Failed - 12/23/2020  6:38 PM        Failed - Renal function test in last year     Creatinine   Date Value Ref Range Status   10/10/2017 1.09 0.70 - 1.30 mg/dL Final             Failed - Route to appropriate pool/provider     Last Anticoagulation Summary:           Passed - PCP or prescribing provider visit in past 12 months       Last office visit with prescriber/PCP: 10/10/2017 Sherman Avelar MD OR same dept: Visit date not found OR same specialty: 10/10/2017 Sherman Avelar MD  Last physical: Visit date not found Last MTM visit: Visit date not found   Next visit within 3 mo: Visit date not found  Next physical within 3 mo: Visit date not found  Prescriber OR PCP: Sherman Avelar MD  Last diagnosis associated with med order: 1. CAD (coronary artery disease)  - XARELTO 20 mg tablet [Pharmacy Med Name: XARELTO 20MG TABLETS]; TAKE 1 TABLET(20 MG) BY MOUTH DAILY WITH SUPPER  Dispense: 90 tablet; Refill: 1    If protocol passes may refill for 12 months if within 3 months of last provider visit (or a total of 15 months).

## 2021-06-16 PROBLEM — G25.81 RESTLESS LEGS SYNDROME (RLS): Status: ACTIVE | Noted: 2017-10-10

## 2021-06-16 PROBLEM — I48.0 PAROXYSMAL ATRIAL FIBRILLATION (H): Status: ACTIVE | Noted: 2020-09-10

## 2021-06-16 PROBLEM — I25.10 ATHEROSCLEROSIS OF NATIVE CORONARY ARTERY OF NATIVE HEART WITHOUT ANGINA PECTORIS: Status: ACTIVE | Noted: 2017-10-10

## 2021-06-23 NOTE — TELEPHONE ENCOUNTER
RN cannot approve Refill Request    RN can NOT refill this medication PCP messaged that patient is overdue for Labs and Office Visit.     Kaylie Siddiqui, [facility] Triage/Med Refill 1/22/2019    Requested Prescriptions   Pending Prescriptions Disp Refills     sotalol (BETAPACE) 80 MG tablet [Pharmacy Med Name: SOTALOL 80MG TABLETS] 90 tablet 0     Sig: TAKE 1/2 TABLET(40 MG) BY MOUTH TWICE DAILY    Sotalol Refill Protocol Failed - 1/20/2019 12:05 PM       Failed - LFT or AST or ALT on file in last 12 months    Albumin   Date Value Ref Range Status   10/10/2017 3.6 3.5 - 5.0 g/dL Final     Bilirubin, Total   Date Value Ref Range Status   10/10/2017 1.6 (H) 0.0 - 1.0 mg/dL Final     Alkaline Phosphatase   Date Value Ref Range Status   10/10/2017 72 45 - 120 U/L Final     AST   Date Value Ref Range Status   10/10/2017 30 0 - 40 U/L Final     ALT   Date Value Ref Range Status   10/10/2017 10 0 - 45 U/L Final     Protein, Total   Date Value Ref Range Status   10/10/2017 6.8 6.0 - 8.0 g/dL Final               Failed - PCP or prescribing provider visit in past 6 months or next 3 months    Last office visit with prescriber/PCP: Visit date not found OR same dept: Visit date not found OR same specialty: 10/10/2017 Sherman Avelar MD Last physical: Visit date not found Last MTM visit: Visit date not found     Next appt within 3 mo: Visit date not found  Next physical within 3 mo: Visit date not found  Prescriber OR PCP: Sherman Avelar MD  Last diagnosis associated with med order: There are no diagnoses linked to this encounter.  If protocol passes may refill for 6 months if within 3 months of last provider visit (or a total of 9 months).             Failed - BMP on file in last 12 months    Sodium   Date Value Ref Range Status   10/10/2017 140 136 - 145 mmol/L Final     Potassium   Date Value Ref Range Status   10/10/2017 3.9 3.5 - 5.0 mmol/L Final     Chloride   Date Value Ref Range Status   10/10/2017 108 (H)  98 - 107 mmol/L Final     CO2   Date Value Ref Range Status   10/10/2017 24 22 - 31 mmol/L Final     BUN   Date Value Ref Range Status   10/10/2017 17 8 - 28 mg/dL Final     Creatinine   Date Value Ref Range Status   10/10/2017 1.09 0.70 - 1.30 mg/dL Final            Failed - CBC w/plts (hm2) on file in last 12 months    WBC   Date Value Ref Range Status   10/10/2017 4.9 4.0 - 11.0 thou/uL Final     Hemoglobin   Date Value Ref Range Status   10/10/2017 13.2 (L) 14.0 - 18.0 g/dL Final     Hematocrit   Date Value Ref Range Status   10/10/2017 38.5 (L) 40.0 - 54.0 % Final     Platelets   Date Value Ref Range Status   10/10/2017 132 (L) 140 - 440 thou/uL Final            Failed - ECG in last 12 months    ECG rhythm strip: No results found for this or any previous visit. ECG 12 lead MUSE: No results found for this or any previous visit. ECG 12 lead nursing unit: No results found for this or any previous visit.         Failed - Magnesium in last 12 months    No results found for: MG          Failed - Serum creatinine in last 12 months    Creatinine   Date Value Ref Range Status   10/10/2017 1.09 0.70 - 1.30 mg/dL Final

## 2021-06-26 ENCOUNTER — COMMUNICATION - HEALTHEAST (OUTPATIENT)
Dept: INTERNAL MEDICINE | Facility: CLINIC | Age: 81
End: 2021-06-26

## 2021-06-26 DIAGNOSIS — I25.10 CAD (CORONARY ARTERY DISEASE): ICD-10-CM

## 2021-07-07 NOTE — TELEPHONE ENCOUNTER
RN cannot approve Refill Request    RN can NOT refill this medication med is not covered by policy/route to provider. Last office visit: 10/10/2017 Sherman Avelar MD Last Physical: Visit date not found Last MTM visit: Visit date not found Last visit same specialty: 10/10/2017 Sherman Avelar MD.  Next visit within 3 mo: Visit date not found  Next physical within 3 mo: Visit date not found      Niya Rios, [facility] Triage/Med Refill 6/26/2021    Requested Prescriptions   Pending Prescriptions Disp Refills     rivaroxaban ANTICOAGULANT (XARELTO) 20 mg tablet [Pharmacy Med Name: XARELTO 20 MG Tablet] 90 tablet 1     Sig: TAKE 1 TABLET EVERY DAY WITH SUPPER       Apixaban/Rivaroxaban/Dabigatran Refill Protocol Failed - 6/26/2021  8:04 AM        Failed - Renal function test in last year     Creatinine   Date Value Ref Range Status   10/10/2017 1.09 0.70 - 1.30 mg/dL Final             Failed - Route to appropriate pool/provider     Last Anticoagulation Summary:           Passed - PCP or prescribing provider visit in past 12 months       Last office visit with prescriber/PCP: 10/10/2017 Sherman Avelar MD OR same dept: Visit date not found OR same specialty: 10/10/2017 Sherman Avelar MD  Last physical: Visit date not found Last MTM visit: Visit date not found   Next visit within 3 mo: Visit date not found  Next physical within 3 mo: Visit date not found  Prescriber OR PCP: Sherman Avelar MD  Last diagnosis associated with med order: 1. CAD (coronary artery disease)  - XARELTO 20 mg tablet [Pharmacy Med Name: XARELTO 20 MG Tablet]; TAKE 1 TABLET EVERY DAY WITH SUPPER  Dispense: 90 tablet; Refill: 1    If protocol passes may refill for 12 months if within 3 months of last provider visit (or a total of 15 months).

## 2021-07-15 ENCOUNTER — TRANSFERRED RECORDS (OUTPATIENT)
Dept: HEALTH INFORMATION MANAGEMENT | Facility: CLINIC | Age: 81
End: 2021-07-15

## 2021-08-22 ENCOUNTER — HEALTH MAINTENANCE LETTER (OUTPATIENT)
Age: 81
End: 2021-08-22

## 2021-10-17 ENCOUNTER — HEALTH MAINTENANCE LETTER (OUTPATIENT)
Age: 81
End: 2021-10-17

## 2021-12-03 ENCOUNTER — TRANSFERRED RECORDS (OUTPATIENT)
Dept: HEALTH INFORMATION MANAGEMENT | Facility: CLINIC | Age: 81
End: 2021-12-03

## 2021-12-14 DIAGNOSIS — E78.5 HYPERLIPIDEMIA: ICD-10-CM

## 2021-12-16 RX ORDER — SIMVASTATIN 10 MG
TABLET ORAL
Qty: 90 TABLET | Refills: 3 | Status: SHIPPED | OUTPATIENT
Start: 2021-12-16 | End: 2022-12-19

## 2021-12-16 NOTE — TELEPHONE ENCOUNTER
"Routing refill request to provider for review/approval because:  Labs not current:  LDL  Patient needs to be seen because it has been more than 1 year since last office visit.    Last Written Prescription Date:  12/18/2020  Last Fill Quantity: 90,  # refills: 3   Last office visit provider:  9/10/2020     Requested Prescriptions   Pending Prescriptions Disp Refills     simvastatin (ZOCOR) 10 MG tablet 90 tablet 3     Sig: [SIMVASTATIN (ZOCOR) 10 MG TABLET] TAKE 1 TABLET(10 MG) BY MOUTH AT BEDTIME       Statins Protocol Failed - 12/14/2021 11:23 AM        Failed - LDL on file in past 12 months     Recent Labs   Lab Test 10/10/17  1531   LDL 74             Failed - Recent (12 mo) or future (30 days) visit within the authorizing provider's specialty     Patient has had an office visit with the authorizing provider or a provider within the authorizing providers department within the previous 12 mos or has a future within next 30 days. See \"Patient Info\" tab in inbasket, or \"Choose Columns\" in Meds & Orders section of the refill encounter.              Passed - No abnormal creatine kinase in past 12 months     No lab results found.             Passed - Medication is active on med list        Passed - Patient is age 18 or older             Brittanie Edmondson RN 12/16/21 10:50 AM  "

## 2022-01-13 ENCOUNTER — TRANSFERRED RECORDS (OUTPATIENT)
Dept: HEALTH INFORMATION MANAGEMENT | Facility: CLINIC | Age: 82
End: 2022-01-13
Payer: MEDICARE

## 2022-01-24 DIAGNOSIS — I48.92 ATRIAL FLUTTER, UNSPECIFIED TYPE (H): ICD-10-CM

## 2022-01-26 RX ORDER — SOTALOL HYDROCHLORIDE 80 MG/1
TABLET ORAL
Qty: 90 TABLET | Refills: 3 | Status: SHIPPED | OUTPATIENT
Start: 2022-01-26 | End: 2023-02-08

## 2022-01-26 NOTE — TELEPHONE ENCOUNTER
"Routing refill request to provider for review/approval because:  Patient needs to be seen because it has been more than 1 year since last office visit.  BP warning    Last Written Prescription Date:  12/23/2020  Last Fill Quantity: 90,  # refills: 3   Last office visit provider:  9/10/2020     Requested Prescriptions   Pending Prescriptions Disp Refills     sotalol (BETAPACE) 80 MG tablet 90 tablet 3       Beta-Blockers Protocol Failed - 1/24/2022 11:23 AM        Failed - Blood pressure under 140/90 in past 12 months     BP Readings from Last 3 Encounters:   No data found for BP                 Failed - Recent (12 mo) or future (30 days) visit within the authorizing provider's specialty     Patient has had an office visit with the authorizing provider or a provider within the authorizing providers department within the previous 12 mos or has a future within next 30 days. See \"Patient Info\" tab in inbasket, or \"Choose Columns\" in Meds & Orders section of the refill encounter.              Passed - Patient is age 6 or older        Passed - Medication is active on med list             Adrienne Serrano RN 01/26/22 7:06 AM  "

## 2022-03-07 ENCOUNTER — TRANSFERRED RECORDS (OUTPATIENT)
Dept: HEALTH INFORMATION MANAGEMENT | Facility: CLINIC | Age: 82
End: 2022-03-07
Payer: MEDICARE

## 2022-03-26 DIAGNOSIS — I25.10 CAD (CORONARY ARTERY DISEASE): ICD-10-CM

## 2022-03-26 NOTE — TELEPHONE ENCOUNTER
Reason for Call:  Medication or medication refill:    Do you use a [facility]?  Name of the pharmacy and phone number for the current request: [facility] - [place], [place] - [address] --Fax # [phone]    Name of the medication requested: doxycycline (PERIOSTAT) 20 MG tablet  Other request: N/A  Can we leave a detailed message on this number? YES    Phone number patient can be reached at: Home number on file [phone] (home) or Cell number on        Best Time: Any    Call taken on 3/26/2022 at 10:45 AM by Shante Cobian

## 2022-03-28 RX ORDER — DOXYCYCLINE HYCLATE 20 MG
20 TABLET ORAL DAILY
Qty: 90 TABLET | Refills: 3 | Status: SHIPPED | OUTPATIENT
Start: 2022-03-28 | End: 2022-09-13

## 2022-09-12 ENCOUNTER — TELEPHONE (OUTPATIENT)
Dept: INTERNAL MEDICINE | Facility: CLINIC | Age: 82
End: 2022-09-12

## 2022-09-13 ENCOUNTER — VIRTUAL VISIT (OUTPATIENT)
Dept: INTERNAL MEDICINE | Facility: CLINIC | Age: 82
End: 2022-09-13
Payer: MEDICARE

## 2022-09-13 DIAGNOSIS — Z00.00 PREVENTATIVE HEALTH CARE: ICD-10-CM

## 2022-09-13 DIAGNOSIS — Z12.11 SCREEN FOR COLON CANCER: ICD-10-CM

## 2022-09-13 DIAGNOSIS — I48.0 PAROXYSMAL ATRIAL FIBRILLATION (H): ICD-10-CM

## 2022-09-13 DIAGNOSIS — U07.1 COVID-19 VIRUS INFECTION: Primary | ICD-10-CM

## 2022-09-13 PROCEDURE — 99203 OFFICE O/P NEW LOW 30 MIN: CPT | Mod: CS | Performed by: INTERNAL MEDICINE

## 2022-09-13 NOTE — PATIENT INSTRUCTIONS
Discontinue Paxlovid    Drug interactions with Xarelto and simvastatin discussed    Contact me if symptoms worsen

## 2022-09-13 NOTE — PROGRESS NOTES
Dwight is a 81 year old male being evaluated via a billable phone visit, and would like to be contacted via the following  Cell number on file:    Telephone Information:   Mobile [phone]       ASSESSMENT and PLAN:  1. COVID-19 virus infection  Minimal symptoms and low risk, taking wife's Paxlovid.  Significant interactions with Xarelto and simvastatin discussed.  Risks outweigh benefit.  Recommend stopping and not continuing    2. Screen for colon cancer  Has aged out of colon cancer screening    3. Paroxysmal atrial fibrillation (H)  On Xarelto.  Significant interaction with Paxlovid.  Advised to discontinue Paxlovid    4. Preventative health care  Reviewed  - REVIEW OF HEALTH MAINTENANCE PROTOCOL ORDERS  - TDAP VACCINE (Adacel, Boostrix); Future       Patient Instructions   Discontinue Paxlovid    Drug interactions with Xarelto and simvastatin discussed    Contact me if symptoms worsen            Return in about 6 months (around 3/13/2023) for using a video visit.       CHIEF COMPLAINT:  Chief Complaint   Patient presents with     Covid Concern     Tested positive 9/12/22 (Current sxs's: cough)        HISTORY OF PRESENT ILLNESS:  Dwight is a 81 year old male contacting the clinic today via phone for Covid    Became symptomatic with viral symptoms on September 10.  Tested positive for COVID on September 11.  Primary symptomatic complaints include fatigue and slight fever    Vaccinated against Covid x 4 with Pfizer    Wife has dementia and was seen, tested positive, and placed on Paxlovid.  They have been sharing a prescription.  Dwight has felt increased fatigue and bad taste.  We discussed significant drug interactions with Xarelto and simvastatin by taking his wife's prescription, minimal benefit based on his health status and risk score of for an omicron and that risks outweigh benefits.  I recommend stopping    Discussed that infection with omicron is the equivalent of a vaccination to omicron, and that  this infection/vaccination will supplement the original COVID vaccines    Discussed that omicron is much more contagious and much less virulent than previous versions of COVID and that we are seeing very few cases go to the hospital, or end up in the ICU or on ventilators.  The listed MASSBP score is 4 and renal function is normal by chart review.  No results found for: CR and No results found for: GFRESTIMATED    Discussed that natural immunity to natural infection has always been superior to artificial immunity from vaccination throughout history and that this illness and subsequent antibody levels when taken in conjunction with previous vaccination to original COVID should result in superior immunity to that provided by ongoing COVID vaccine    Discussed that Paxlovid was studied and brought to market on patients who were not vaccinated, thus the benefit of Paxlovid in vaccinated people with omicron is less clear and that Paxlovid can be associated with approximately 10% risk of rebound.  In addition Paxlovid interacts with many medications including cholesterol medicine, blood pressure medicine, and blood thinners.    Discussed that home antigen tests can sometimes be negative and the setting of COVID which means that they are not contagious.  Home test can be used as a marker of contagiousness, and contagiousness usually lasts for about 5 days in those who have been vaccinated.  Previous guidelines for 10 days were based on the fact that unvaccinated people can be contagious for 10 days or longer.      REVIEW OF SYSTEMS:  Increased muscle aches    PFSH:  Social History     Social History Narrative     to wife Karin    3 boys    Retired     Likes to be physically active    Active in [employer]       TOBACCO USE:  History   Smoking Status     Never Smoker   Smokeless Tobacco     Not on file       VITALS:  There were no vitals filed for this visit.  There were no vitals taken for this visit.  "Estimated body mass index is 27.26 kg/m  as calculated from the following:    Height as of 10/10/17: 1.791 m (5' 10.5\").    Weight as of 10/10/17: 87.4 kg (192 lb 11.2 oz).    PHYSICAL EXAM:  (observations via Phone)  Alert and oriented    MEDICATIONS  Current Outpatient Medications   Medication Sig Dispense Refill     carbidopa-levodopa (SINEMET CR)  mg ER tablet [CARBIDOPA-LEVODOPA (SINEMET CR)  MG ER TABLET] Take 2 tablets by mouth 5 (five) times a day.  0     rivaroxaban ANTICOAGULANT (XARELTO) 20 mg tablet [RIVAROXABAN ANTICOAGULANT (XARELTO) 20 MG TABLET] TAKE 1 TABLET EVERY DAY WITH SUPPER 90 tablet 1     simvastatin (ZOCOR) 10 MG tablet [SIMVASTATIN (ZOCOR) 10 MG TABLET] TAKE 1 TABLET(10 MG) BY MOUTH AT BEDTIME 90 tablet 3     sotalol (BETAPACE) 80 MG tablet [SOTALOL (BETAPACE) 80 MG TABLET] TAKE 1/2 TABLET(40 MG) BY MOUTH TWICE DAILY 90 tablet 3     rOPINIRole (REQUIP) 0.5 MG tablet [ROPINIROLE (REQUIP) 0.5 MG TABLET] Take 0.5-1 mg by mouth.         Notes summarized:   Labs, x-rays, cardiology, GI tests reviewed:   No results for input(s): HGB, WBC, NA, POTASSIUM, CR, A1C, PSA, URIC, B12, TSH, VITDT, SED, CRP in the last 04723 hours.  No results found for: [id number]  Lab Results   Component Value Date    CHOL 158 10/10/2017     New orders:   Orders Placed This Encounter   Procedures     REVIEW OF HEALTH MAINTENANCE PROTOCOL ORDERS     TDAP VACCINE (Adacel, Boostrix)       Independent review of:  Supplemental history by:      Patient would like to receive their AVS by [facility]    Sherman Avelar MD      [facility]    Phone Start Time: 4:08 PM  Phone End time:  4:24 PM  Conversation plus orders: 16 minutes  Dictation time:  3 minutes    The visit lasted a total of 17 minutes     "

## 2022-10-01 ENCOUNTER — HEALTH MAINTENANCE LETTER (OUTPATIENT)
Age: 82
End: 2022-10-01

## 2022-10-03 ENCOUNTER — OFFICE VISIT (OUTPATIENT)
Dept: INTERNAL MEDICINE | Facility: CLINIC | Age: 82
End: 2022-10-03
Payer: MEDICARE

## 2022-10-03 VITALS
SYSTOLIC BLOOD PRESSURE: 113 MMHG | HEART RATE: 79 BPM | OXYGEN SATURATION: 98 % | TEMPERATURE: 98 F | HEIGHT: 72 IN | DIASTOLIC BLOOD PRESSURE: 70 MMHG | BODY MASS INDEX: 26.72 KG/M2 | WEIGHT: 197.3 LBS

## 2022-10-03 DIAGNOSIS — G20.A1 PARKINSON DISEASE (H): ICD-10-CM

## 2022-10-03 DIAGNOSIS — H26.9 CATARACT OF BOTH EYES, UNSPECIFIED CATARACT TYPE: ICD-10-CM

## 2022-10-03 DIAGNOSIS — Z01.818 PREOPERATIVE EXAMINATION: Primary | ICD-10-CM

## 2022-10-03 DIAGNOSIS — I48.0 PAROXYSMAL ATRIAL FIBRILLATION (H): ICD-10-CM

## 2022-10-03 PROCEDURE — 99214 OFFICE O/P EST MOD 30 MIN: CPT | Performed by: INTERNAL MEDICINE

## 2022-10-03 NOTE — PATIENT INSTRUCTIONS
Preparing for Your Surgery  Getting started  A nurse will call you to review your health history and instructions. They will give you an arrival time based on your scheduled surgery time. Please be ready to share:    Your doctor's clinic name and phone number    Your medical, surgical and anesthesia history    A list of allergies and sensitivities    A list of medicines, including herbal treatments and over-the-counter drugs    Whether the patient has a legal guardian (ask how to send us the papers in advance)  Please tell us if you're pregnant--or if there's any chance you might be pregnant. Some surgeries may injure a fetus (unborn baby), so they require a pregnancy test. Surgeries that are safe for a fetus don't always need a test, and you can choose whether to have one.   If you have a child who's having surgery, please ask for a copy of Preparing for Your Child's Surgery.    Preparing for surgery    Within 10 to 30 days of surgery: Have a pre-op exam (sometimes called an H&P, or History and Physical). This can be done at a clinic or pre-operative center.  ? If you're having a , you may not need this exam. Talk to your care team.    At your pre-op exam, talk to your care team about all medicines you take. If you need to stop any medicines before surgery, ask when to start taking them again.  ? We do this for your safety. Many medicines can make you bleed too much during surgery. Some change how well surgery (anesthesia) drugs work.    Call your insurance company to let them know you're having surgery. (If you don't have insurance, call [phone].)    Call your clinic if there's any change in your health. This includes signs of a cold or flu (sore throat, runny nose, cough, rash, fever). It also includes a scrape or scratch near the surgery site.    If you have questions on the day of surgery, call your hospital or surgery center.  COVID testing  You may need to be tested for COVID-19 before having  surgery. If so, we will give you instructions (or click here).  Eating and drinking guidelines  For your safety: Unless your surgeon tells you otherwise, follow the guidelines below.    Eat and drink as usual until 8 hours before surgery. After that, no food or milk.    Drink clear liquids until 2 hours before surgery. These are liquids you can see through, like water, Gatorade and Propel Water. You may also have black coffee and tea (no cream or milk).    Nothing by mouth within 2 hours of surgery. This includes gum, candy and breath mints.    If you drink alcohol: Stop drinking it the night before surgery.    If your care team tells you to take medicine on the morning of surgery, it's okay to take it with a sip of water.  Preventing infection    Shower or bathe the night before and morning of your surgery. Follow the instructions your clinic gave you. (If no instructions, use regular soap.)    Don't shave or clip hair near your surgery site. We'll remove the hair if needed.    Don't smoke or vape the morning of surgery. You may chew nicotine gum up to 2 hours before surgery. A nicotine patch is okay.  ? Note: Some surgeries require you to completely quit smoking and nicotine. Check with your surgeon.    Your care team will make every effort to keep you safe from infection. We will:  ? Clean our hands often with soap and water (or an alcohol-based hand rub).  ? Clean the skin at your surgery site with a special soap that kills germs.  ? Give you a special gown to keep you warm. (Cold raises the risk of infection.)  ? Wear special hair covers, masks, gowns and gloves during surgery.  ? Give antibiotic medicine, if prescribed. Not all surgeries need antibiotics.  What to bring on the day of surgery    Photo ID and insurance card    Copy of your health care directive, if you have one    Glasses and hearing aides (bring cases)  ? You can't wear contacts during surgery    Inhaler and eye drops, if you use them (tell us  about these when you arrive)    CPAP machine or breathing device, if you use them    A few personal items, if spending the night    If you have . . .  ? A pacemaker, ICD (cardiac defibrillator) or other implant: Bring the ID card.  ? An implanted stimulator: Bring the remote control.  ? A legal guardian: Bring a copy of the certified (court-stamped) guardianship papers.  Please remove any jewelry, including body piercings. Leave jewelry and other valuables at home.  If you're going home the day of surgery    You must have a responsible adult drive you home. They should stay with you overnight as well.    If you don't have someone to stay with you, and you aren't safe to go home alone, we may keep you overnight. Insurance often won't pay for this.  After surgery  If it's hard to control your pain or you need more pain medicine, please call your surgeon's office.  Questions?   If you have any questions for your care team, list them here: _________________________________________________________________________________________________________________________________________________________________________ ____________________________________ ____________________________________ ____________________________________  For informational purposes only. Not to replace the advice of your health care provider. Copyright   2003, 2019 [facility]. All rights reserved. Clinically reviewed by Mirella Jacques MD. [employer] 222636 - REV 07/22.    How to Take Your Medication Before Surgery  - Take all of your medications before surgery as usual--take with just a sip of water to get them down.

## 2022-10-03 NOTE — PROGRESS NOTES
[facility]  [address] [place] [address]  Phone: [phone]  Fax: [phone]  Primary Provider: Sherman Avelar  Pre-op Performing Provider: KD WINTERS      PREOPERATIVE EVALUATION:  Today's date: 10/3/2022    Dwight Boyer is a 81 year old male who presents for a preoperative evaluation.    Surgical Information:  Surgery/Procedure: Bilateral Cataract Surgery   Surgery Location: [facility]  Surgeon: Dr. Gabby Robison  Surgery Date: 10/20/22 & 11/3/22  Time of Surgery: TBD    Where patient plans to recover: At home with family  Fax number for surgical facility: TBD      Type of Anesthesia Anticipated: to be determined    1. Preoperative examination  Proceed with surgery as planned.  He was given perioperative med management instructions    2. Cataract of both eyes, unspecified cataract type  As above.    3. Parkinson disease (H)  He will continue his Sinemet perioperatively.    4. Paroxysmal atrial fibrillation (H)  He will continue his Xarelto perioperatively.  Continue taking all of his medications including antiarrhythmic medications perioperatively.    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  He has Parkinson's and A. fib and coronary disease.  All of been stable.  Recently had COVID but got through that without difficulty.  He will be moving to the [facility] with his wife who has dementia in the next couple months.  He denies any new somatic concerns for me.  Would like a flu shot today.    Preop Questions 10/3/2022   1. Have you ever had a heart attack or stroke? No   2. Have you ever had surgery on your heart or blood vessels, such as a stent placement, a coronary artery bypass, or surgery on an artery in your head, neck, heart, or legs? YES    3. Do you  have chest pain with activity? No   4. Do you have a history of  heart failure? No   5. Do you currently have a cold, bronchitis or symptoms of other infection? No   6. Do you have a cough, shortness of breath, or wheezing? No   7. Do you or anyone in your family have previous history of blood clots? No   8. Do you or does anyone in your family have a serious bleeding problem such as prolonged bleeding following surgeries or cuts? No   9. Have you ever had problems with anemia or been told to take iron pills? No   10. Have you had any abnormal blood loss such as black, tarry or bloody stools? No   11. Have you ever had a blood transfusion? No   12. Are you willing to have a blood transfusion if it is medically needed before, during, or after your surgery? Yes   13. Have you or any of your relatives ever had problems with anesthesia? No   14. Do you have sleep apnea, excessive snoring or daytime drowsiness? No   15. Do you have any artifical heart valves or other implanted medical devices like a pacemaker, defibrillator, or continuous glucose monitor? YES    15a. What type of device do you have? No Chains   15b. Name of the clinic that manages your device:  [facility]   16. Do you have artificial joints? No   17. Are you allergic to latex? No       Health Care Directive:  Patient does not have a Health Care Directive or Living Will: Patient states has Advance Directive and will bring in a copy to clinic.    Preoperative Review of :   reviewed - no record of controlled substances prescribed.      Status of Chronic Conditions:  See problem list for active medical problems.  Problems all longstanding and stable, except as noted/documented.  See ROS for pertinent symptoms related to these conditions.      Review of Systems  Constitutional, neuro, ENT, endocrine, pulmonary, cardiac, gastrointestinal, genitourinary, musculoskeletal, integument and psychiatric systems are negative, except as  otherwise noted.    Patient Active Problem List    Diagnosis Date Noted     Paroxysmal atrial fibrillation (H) 09/10/2020     Priority: Medium     Abnormal involuntary movement      Priority: Medium     Created by Conversion         Atherosclerosis of native coronary artery of native heart without angina pectoris 10/10/2017     Priority: Medium     Restless legs syndrome (RLS) 10/10/2017     Priority: Medium     Diverticulosis      Priority: Medium     Created by Conversion  Replacement Utility updated for latest IMO load         Cardiac pacemaker in situ 06/12/2015     Priority: Medium     Parkinsons disease (H) 01/27/2015     Priority: Medium     Neuritis      Priority: Medium     Created by Conversion         Microscopic Hematuria      Priority: Medium     Created by Conversion         Rosacea      Priority: Medium     Created by Conversion         Hyperglycemia      Priority: Medium     Created by Conversion         Vocal Cord Disorder      Priority: Medium     Created by Conversion         Typical atrial flutter (H) 01/05/2014     Priority: Medium     Chronic ischemic heart disease 01/05/2014     Priority: Medium     CAD (coronary artery disease) 01/05/2014     Priority: Medium      Past Medical History:   Diagnosis Date     Atrial fibrillation (H)      Coronary artery disease      Hyperlipidemia     pt denies     Inguinal hernia     arvin     Parkinson disease (H)      Rosacea      Past Surgical History:   Procedure Laterality Date     APPENDECTOMY       ARTHROSCOPY KNEE Left      CORONARY STENT PLACEMENT  2007      KNEE SCOPE, DIAGNOSTIC      Description: Arthroscopy Knee Right;  Recorded: 09/09/2008;     OTHER SURGICAL HISTORY      CORONARY ABLATION     [facility] APPENDECTOMY      Description: Appendectomy;  Recorded: 09/09/2008;     Current Outpatient Medications   Medication Sig Dispense Refill     carbidopa-levodopa (SINEMET CR)  mg ER tablet [CARBIDOPA-LEVODOPA (SINEMET CR)  MG ER TABLET] Take 2  "tablets by mouth 5 (five) times a day.  0     rivaroxaban ANTICOAGULANT (XARELTO) 20 mg tablet [RIVAROXABAN ANTICOAGULANT (XARELTO) 20 MG TABLET] TAKE 1 TABLET EVERY DAY WITH SUPPER 90 tablet 1     simvastatin (ZOCOR) 10 MG tablet [SIMVASTATIN (ZOCOR) 10 MG TABLET] TAKE 1 TABLET(10 MG) BY MOUTH AT BEDTIME 90 tablet 3     sotalol (BETAPACE) 80 MG tablet [SOTALOL (BETAPACE) 80 MG TABLET] TAKE 1/2 TABLET(40 MG) BY MOUTH TWICE DAILY 90 tablet 3     rOPINIRole (REQUIP) 0.5 MG tablet [ROPINIROLE (REQUIP) 0.5 MG TABLET] Take 0.5-1 mg by mouth.         Allergies   Allergen Reactions     No Known Drug Allergies Unknown        Social History     Tobacco Use     Smoking status: Never Smoker     Smokeless tobacco: Not on file   Substance Use Topics     Alcohol use: Yes     Comment: Alcoholic Drinks/day: 1 BEER WEEK     No family history on file.  History   Drug Use No         Objective     /70 (BP Location: Left arm, Patient Position: Sitting, Cuff Size: Adult Large)   Pulse 79   Temp 98  F (36.7  C) (Tympanic)   Ht 1.816 m (5' 11.5\")   Wt 89.5 kg (197 lb 4.8 oz)   SpO2 98%   BMI 27.13 kg/m      Physical Exam    GENERAL APPEARANCE: no distress     EYES: Cataracts apparent bilaterally     HENT: Hearing aids in place.  External ears normal.     NECK: no adenopathy, no asymmetry, masses, or scars and thyroid normal to palpation     RESP: lungs clear to auscultation - no rales, rhonchi or wheezes     CV: regular rates and rhythm, normal S1 S2, no S3 or S4 and no murmur, click or rub     ABDOMEN:  soft, nontender, no HSM or masses and bowel sounds normal     MS: extremities normal- no gross deformities noted, no evidence of inflammation in joints, FROM in all extremities.     SKIN: no suspicious lesions or rashes     NEURO: Parkinsonian features with tremor and masked face     PSYCH: mentation appears normal. and affect normal/bright     LYMPHATICS: No cervical adenopathy    No results for input(s): HGB, PLT, INR, NA, " POTASSIUM, CR, A1C in the last 25750 hours.     Diagnostics:  No labs were ordered during this visit.   No EKG required for low risk surgery (cataract, skin procedure, breast biopsy, etc).    Revised Cardiac Risk Index (RCRI):  The patient has the following serious cardiovascular risks for perioperative complications:   - No serious cardiac risks = 0 points     RCRI Interpretation: 0 points: Class I (very low risk - 0.4% complication rate)           Signed Electronically by: KD WINTERS MD  Copy of this evaluation report is provided to requesting physician.    }

## 2022-12-19 DIAGNOSIS — E78.5 HYPERLIPIDEMIA: ICD-10-CM

## 2022-12-19 RX ORDER — SIMVASTATIN 10 MG
TABLET ORAL
Qty: 90 TABLET | Refills: 3 | Status: SHIPPED | OUTPATIENT
Start: 2022-12-19 | End: 2023-02-08

## 2022-12-19 NOTE — TELEPHONE ENCOUNTER
"Routing refill request to provider for review/approval because:  Labs not current:  LDL 10/10/2017     Last Written Prescription Date:  12/16/2021  Last Fill Quantity: 90,  # refills: 3   Last office visit provider:  10/3/2022     Requested Prescriptions   Pending Prescriptions Disp Refills     simvastatin (ZOCOR) 10 MG tablet [Pharmacy Med Name: SIMVASTATIN 10 MG Tablet] 90 tablet 3     Sig: TAKE 1 TABLET AT BEDTIME       Statins Protocol Failed - 12/19/2022  3:30 PM        Failed - LDL on file in past 12 months     Recent Labs   Lab Test 10/10/17  1531   LDL 74             Passed - No abnormal creatine kinase in past 12 months     No lab results found.             Passed - Recent (12 mo) or future (30 days) visit within the authorizing provider's specialty     Patient has had an office visit with the authorizing provider or a provider within the authorizing providers department within the previous 12 mos or has a future within next 30 days. See \"Patient Info\" tab in inbasket, or \"Choose Columns\" in Meds & Orders section of the refill encounter.              Passed - Medication is active on med list        Passed - Patient is age 18 or older             Dora Ball RN 12/19/22 3:33 PM  "

## 2023-01-21 ENCOUNTER — TELEPHONE (OUTPATIENT)
Dept: INTERNAL MEDICINE | Facility: CLINIC | Age: 83
End: 2023-01-21
Payer: MEDICARE

## 2023-01-21 DIAGNOSIS — I48.0 PAROXYSMAL ATRIAL FIBRILLATION (H): Primary | ICD-10-CM

## 2023-01-21 DIAGNOSIS — I25.10 CAD (CORONARY ARTERY DISEASE): ICD-10-CM

## 2023-01-21 NOTE — TELEPHONE ENCOUNTER
Medication Question or Refill        What medication are you calling about (include dose and sig)?: xarelto 15mg    Controlled Substance Agreement on file:   CSA -- Patient Level:    CSA: None found at the patient level.       Who prescribed the medication?: PCP    Do you need a refill? Yes:     When did you use the medication last? daily    Patient offered an appointment? No    Do you have any questions or concerns?  No    Preferred Pharmacy:   [id number] [place] [email or web address] [facility] [address]  [address] [facility] [address]  Phone: [phone] Fax: [phone]      Could we send this information to you in [facility] or would you prefer to receive a phone call?:   Patient would prefer a phone call   Okay to leave a detailed message?: Yes at Other phone number:  [phone]*

## 2023-02-07 DIAGNOSIS — E78.5 HYPERLIPIDEMIA: ICD-10-CM

## 2023-02-07 DIAGNOSIS — I48.92 ATRIAL FLUTTER, UNSPECIFIED TYPE (H): ICD-10-CM

## 2023-02-08 RX ORDER — SIMVASTATIN 10 MG
10 TABLET ORAL AT BEDTIME
Qty: 90 TABLET | Refills: 0 | Status: SHIPPED | OUTPATIENT
Start: 2023-02-08 | End: 2023-05-12

## 2023-02-08 RX ORDER — SOTALOL HYDROCHLORIDE 80 MG/1
40 TABLET ORAL 2 TIMES DAILY
Qty: 90 TABLET | Refills: 2 | Status: SHIPPED | OUTPATIENT
Start: 2023-02-08 | End: 2024-02-21

## 2023-02-08 NOTE — TELEPHONE ENCOUNTER
"Routing refill request to provider for review/approval because:  Labs not current:  LDL  Due to medication information not transferring due to [facility] please review the medication information prior to signing to ensure accuracy.    Last Written Prescription Date:  1/26/22  Last Fill Quantity: 90,  # refills: 3   Last office visit provider:  10/3/22     Requested Prescriptions   Pending Prescriptions Disp Refills     simvastatin (ZOCOR) 10 MG tablet 90 tablet 3     Sig: TAKE 1 TABLET AT BEDTIME       Statins Protocol Failed - 2/8/2023 10:18 AM        Failed - LDL on file in past 12 months     Recent Labs   Lab Test 10/10/17  1531   LDL 74             Passed - No abnormal creatine kinase in past 12 months     No lab results found.             Passed - Recent (12 mo) or future (30 days) visit within the authorizing provider's specialty     Patient has had an office visit with the authorizing provider or a provider within the authorizing providers department within the previous 12 mos or has a future within next 30 days. See \"Patient Info\" tab in inbasket, or \"Choose Columns\" in Meds & Orders section of the refill encounter.              Passed - Medication is active on med list        Passed - Patient is age 18 or older           sotalol (BETAPACE) 80 MG tablet 90 tablet 3     Sig: [SOTALOL (BETAPACE) 80 MG TABLET] TAKE 1/2 TABLET(40 MG) BY MOUTH TWICE DAILY       Beta-Blockers Protocol Passed - 2/7/2023 11:15 AM        Passed - Blood pressure under 140/90 in past 12 months     BP Readings from Last 3 Encounters:   10/03/22 113/70                 Passed - Patient is age 6 or older        Passed - Recent (12 mo) or future (30 days) visit within the authorizing provider's specialty     Patient has had an office visit with the authorizing provider or a provider within the authorizing providers department within the previous 12 mos or has a future within next 30 days. See \"Patient Info\" tab in inbasket, or \"Choose Columns\" in " Meds & Orders section of the refill encounter.              Passed - Medication is active on med list             Hector Perez RN 02/08/23 10:18 AM

## 2023-03-10 ENCOUNTER — TELEPHONE (OUTPATIENT)
Dept: INTERNAL MEDICINE | Facility: CLINIC | Age: 83
End: 2023-03-10
Payer: MEDICARE

## 2023-03-10 DIAGNOSIS — R25.0 ABNORMAL HEAD MOVEMENTS: ICD-10-CM

## 2023-03-10 DIAGNOSIS — I25.10 ATHEROSCLEROSIS OF NATIVE CORONARY ARTERY OF NATIVE HEART WITHOUT ANGINA PECTORIS: ICD-10-CM

## 2023-03-10 DIAGNOSIS — G20.A1 PARKINSON DISEASE (H): ICD-10-CM

## 2023-03-10 DIAGNOSIS — I48.0 PAROXYSMAL ATRIAL FIBRILLATION (H): Primary | ICD-10-CM

## 2023-03-10 DIAGNOSIS — G25.81 RESTLESS LEGS SYNDROME (RLS): ICD-10-CM

## 2023-03-10 RX ORDER — CYANOCOBALAMIN 1000 UG/ML
1000 INJECTION, SOLUTION INTRAMUSCULAR; SUBCUTANEOUS
Status: ACTIVE | OUTPATIENT
Start: 2023-03-11

## 2023-03-16 ENCOUNTER — ALLIED HEALTH/NURSE VISIT (OUTPATIENT)
Dept: FAMILY MEDICINE | Facility: CLINIC | Age: 83
End: 2023-03-16
Payer: MEDICARE

## 2023-03-16 ENCOUNTER — LAB (OUTPATIENT)
Dept: LAB | Facility: CLINIC | Age: 83
End: 2023-03-16
Payer: MEDICARE

## 2023-03-16 DIAGNOSIS — R25.0 ABNORMAL HEAD MOVEMENTS: ICD-10-CM

## 2023-03-16 DIAGNOSIS — I25.10 ATHEROSCLEROSIS OF NATIVE CORONARY ARTERY OF NATIVE HEART WITHOUT ANGINA PECTORIS: ICD-10-CM

## 2023-03-16 DIAGNOSIS — I48.0 PAROXYSMAL ATRIAL FIBRILLATION (H): ICD-10-CM

## 2023-03-16 DIAGNOSIS — G20.A1 PARKINSON DISEASE (H): ICD-10-CM

## 2023-03-16 DIAGNOSIS — E53.8 B12 DEFICIENCY: Primary | ICD-10-CM

## 2023-03-16 LAB
ALBUMIN SERPL BCG-MCNC: 4.5 G/DL (ref 3.5–5.2)
ALP SERPL-CCNC: 78 U/L (ref 40–129)
ALT SERPL W P-5'-P-CCNC: 8 U/L (ref 10–50)
ANION GAP SERPL CALCULATED.3IONS-SCNC: 14 MMOL/L (ref 7–15)
AST SERPL W P-5'-P-CCNC: 38 U/L (ref 10–50)
BASOPHILS # BLD AUTO: 0 10E3/UL (ref 0–0.2)
BASOPHILS NFR BLD AUTO: 0 %
BILIRUB SERPL-MCNC: 1.4 MG/DL
BUN SERPL-MCNC: 24.8 MG/DL (ref 8–23)
CALCIUM SERPL-MCNC: 9.3 MG/DL (ref 8.8–10.2)
CHLORIDE SERPL-SCNC: 107 MMOL/L (ref 98–107)
CHOLEST SERPL-MCNC: 133 MG/DL
CREAT SERPL-MCNC: 1.21 MG/DL (ref 0.67–1.17)
DEPRECATED HCO3 PLAS-SCNC: 21 MMOL/L (ref 22–29)
EOSINOPHIL # BLD AUTO: 0.2 10E3/UL (ref 0–0.7)
EOSINOPHIL NFR BLD AUTO: 3 %
ERYTHROCYTE [DISTWIDTH] IN BLOOD BY AUTOMATED COUNT: 12.9 % (ref 10–15)
GFR SERPL CREATININE-BSD FRML MDRD: 59 ML/MIN/1.73M2
GLUCOSE SERPL-MCNC: 81 MG/DL (ref 70–99)
HCT VFR BLD AUTO: 38.5 % (ref 40–53)
HDLC SERPL-MCNC: 67 MG/DL
HGB BLD-MCNC: 12.9 G/DL (ref 13.3–17.7)
IMM GRANULOCYTES # BLD: 0 10E3/UL
IMM GRANULOCYTES NFR BLD: 1 %
LDLC SERPL CALC-MCNC: 54 MG/DL
LYMPHOCYTES # BLD AUTO: 1.1 10E3/UL (ref 0.8–5.3)
LYMPHOCYTES NFR BLD AUTO: 24 %
MCH RBC QN AUTO: 31.9 PG (ref 26.5–33)
MCHC RBC AUTO-ENTMCNC: 33.5 G/DL (ref 31.5–36.5)
MCV RBC AUTO: 95 FL (ref 78–100)
MONOCYTES # BLD AUTO: 0.6 10E3/UL (ref 0–1.3)
MONOCYTES NFR BLD AUTO: 13 %
NEUTROPHILS # BLD AUTO: 2.7 10E3/UL (ref 1.6–8.3)
NEUTROPHILS NFR BLD AUTO: 59 %
NONHDLC SERPL-MCNC: 66 MG/DL
PLATELET # BLD AUTO: 126 10E3/UL (ref 150–450)
POTASSIUM SERPL-SCNC: 4.5 MMOL/L (ref 3.4–5.3)
PROT SERPL-MCNC: 7.6 G/DL (ref 6.4–8.3)
RBC # BLD AUTO: 4.04 10E6/UL (ref 4.4–5.9)
SODIUM SERPL-SCNC: 142 MMOL/L (ref 136–145)
TRIGL SERPL-MCNC: 58 MG/DL
TSH SERPL DL<=0.005 MIU/L-ACNC: 3.16 UIU/ML (ref 0.3–4.2)
VIT B12 SERPL-MCNC: 399 PG/ML (ref 232–1245)
WBC # BLD AUTO: 4.6 10E3/UL (ref 4–11)

## 2023-03-16 PROCEDURE — 80050 GENERAL HEALTH PANEL: CPT

## 2023-03-16 PROCEDURE — 96372 THER/PROPH/DIAG INJ SC/IM: CPT | Performed by: INTERNAL MEDICINE

## 2023-03-16 PROCEDURE — 82607 VITAMIN B-12: CPT

## 2023-03-16 PROCEDURE — 80061 LIPID PANEL: CPT

## 2023-03-16 PROCEDURE — 36415 COLL VENOUS BLD VENIPUNCTURE: CPT

## 2023-03-16 PROCEDURE — 99207 PR NO CHARGE NURSE ONLY: CPT

## 2023-03-16 RX ADMIN — CYANOCOBALAMIN 1000 MCG: 1000 INJECTION, SOLUTION INTRAMUSCULAR; SUBCUTANEOUS at 10:41

## 2023-03-16 NOTE — PROGRESS NOTES
Clinic Administered Medication Documentation    Administrations This Visit     cyanocobalamin injection 1,000 mcg     Admin Date  03/16/2023 Action  $Given Dose  1,000 mcg Route  Intramuscular Site  Right Deltoid Administered By  Teri Butler MA    Ordering Provider: Sherman Aevlar MD    Patient Supplied?: No                  Injectable Medication Documentation    Patient was given Cyanocobalamin (B-12). Prior to medication administration, verified patients identity using patient s name and date of birth. Please see MAR and medication order for additional information. Patient instructed to remain in clinic for 15 minutes.      Was entire vial of medication used? Yes  Vial/Syringe: Single dose vial  Expiration Date:  6/30/2024  Was this medication supplied by the patient? No

## 2023-05-12 DIAGNOSIS — E78.5 HYPERLIPIDEMIA: Primary | ICD-10-CM

## 2023-05-12 RX ORDER — SIMVASTATIN 10 MG
TABLET ORAL
Qty: 90 TABLET | Refills: 0 | Status: SHIPPED | OUTPATIENT
Start: 2023-05-12 | End: 2023-08-08

## 2023-05-12 NOTE — TELEPHONE ENCOUNTER
"Last Written Prescription Date:  2/8/23  Last Fill Quantity: 90,  # refills: 0  Last office visit provider:   9/13/22    Requested Prescriptions   Pending Prescriptions Disp Refills     simvastatin (ZOCOR) 10 MG tablet [Pharmacy Med Name: SIMVASTATIN 10MG TABLETS] 90 tablet 0     Sig: TAKE 1 TABLET(10 MG) BY MOUTH AT BEDTIME       Statins Protocol Passed - 5/12/2023  7:48 AM        Passed - LDL on file in past 12 months     Recent Labs   Lab Test 03/16/23  1032   LDL 54           Passed - No abnormal creatine kinase in past 12 months     No lab results found.         Passed - Recent (12 mo) or future (30 days) visit within the authorizing provider's specialty     Patient has had an office visit with the authorizing provider or a provider within the authorizing providers department within the previous 12 mos or has a future within next 30 days. See \"Patient Info\" tab in inbasket, or \"Choose Columns\" in Meds & Orders section of the refill encounter.            Passed - Medication is active on med list        Passed - Patient is age 18 or older           Karin Hannah RN 05/12/23 4:04 PM  "

## 2023-08-02 ENCOUNTER — TRANSFERRED RECORDS (OUTPATIENT)
Dept: HEALTH INFORMATION MANAGEMENT | Facility: CLINIC | Age: 83
End: 2023-08-02
Payer: MEDICARE

## 2023-08-08 DIAGNOSIS — E78.5 HYPERLIPIDEMIA: ICD-10-CM

## 2023-08-08 RX ORDER — SIMVASTATIN 10 MG
TABLET ORAL
Qty: 90 TABLET | Refills: 0 | Status: SHIPPED | OUTPATIENT
Start: 2023-08-08 | End: 2023-12-12

## 2023-08-09 NOTE — TELEPHONE ENCOUNTER
"Last Written Prescription Date:  5/12/2023  Last Fill Quantity: 90,  # refills: 0   Last office visit provider:  10/3/2022     Requested Prescriptions   Pending Prescriptions Disp Refills    simvastatin (ZOCOR) 10 MG tablet [Pharmacy Med Name: SIMVASTATIN 10MG TABLETS] 90 tablet 0     Sig: TAKE 1 TABLET(10 MG) BY MOUTH AT BEDTIME       Statins Protocol Passed - 8/8/2023  7:52 PM        Passed - LDL on file in past 12 months     Recent Labs   Lab Test 03/16/23  1032   LDL 54             Passed - No abnormal creatine kinase in past 12 months     No lab results found.             Passed - Recent (12 mo) or future (30 days) visit within the authorizing provider's specialty     Patient has had an office visit with the authorizing provider or a provider within the authorizing providers department within the previous 12 mos or has a future within next 30 days. See \"Patient Info\" tab in inbasket, or \"Choose Columns\" in Meds & Orders section of the refill encounter.              Passed - Medication is active on med list        Passed - Patient is age 18 or older             Shanti Butler RN 08/08/23 9:04 PM  "

## 2023-08-15 NOTE — TELEPHONE ENCOUNTER
Left pt detailed message that he would need an appt with pcp prior to pcp ordering an MRI as pt hasn't been seen in 3 years.  Advised pt to call back to schedule.    Patient participated in identification of needs/problems/goals for treatment

## 2023-08-24 ENCOUNTER — TRANSFERRED RECORDS (OUTPATIENT)
Dept: HEALTH INFORMATION MANAGEMENT | Facility: CLINIC | Age: 83
End: 2023-08-24
Payer: MEDICARE

## 2023-10-15 ENCOUNTER — HEALTH MAINTENANCE LETTER (OUTPATIENT)
Age: 83
End: 2023-10-15

## 2023-10-26 ENCOUNTER — TRANSFERRED RECORDS (OUTPATIENT)
Dept: HEALTH INFORMATION MANAGEMENT | Facility: CLINIC | Age: 83
End: 2023-10-26
Payer: MEDICARE

## 2023-12-12 DIAGNOSIS — E78.5 HYPERLIPIDEMIA: ICD-10-CM

## 2023-12-12 RX ORDER — SIMVASTATIN 10 MG
TABLET ORAL
Qty: 90 TABLET | Refills: 3 | Status: SHIPPED | OUTPATIENT
Start: 2023-12-12 | End: 2024-09-25

## 2023-12-22 DIAGNOSIS — I48.0 PAROXYSMAL ATRIAL FIBRILLATION (H): ICD-10-CM

## 2023-12-22 NOTE — TELEPHONE ENCOUNTER
rivaroxaban ANTICOAGULANT (XARELTO) 20 MG TABS tablet 90 tablet 3 1/21/2023 -- No   Sig: [RIVAROXABAN ANTICOAGULANT (XARELTO) 20 MG TABLET] TAKE 1 TABLET EVERY DAY WITH SUPPER   Sent to pharmacy as: Rivaroxaban 20 MG Oral Tablet (XARELTO)   Class: E-Prescribe   Order: [id number]   E-Prescribing Status: Receipt confirmed by pharmacy (1/21/2023  2:29 PM CS

## 2023-12-22 NOTE — TELEPHONE ENCOUNTER
Medication Question or Refill        What medication are you calling about (include dose and sig)?:     rivaroxaban ANTICOAGULANT (XARELTO) 20 MG TABS tablet      3 ordered          Preferred Pharmacy    [id number] - [place], [place] - [address] [facility] AT [address]  [address] [facility] [address]  Phone: [phone] Fax: [phone]        Who prescribed the medication?: Dr. Avelar    Do you need a refill? Yes, just 7 days worth    When did you use the medication last? 12/20/23    Patient offered an appointment? NA    Do you have any questions or concerns?  Yes: The Pt requested 7 pills for the next week. He's in [place] for 7 days and he forgot his prescription at home in [place].      Could we send this information to you in [facility] or would you prefer to receive a phone call?:   Patient would prefer a phone call   Okay to leave a detailed message?: Yes at Cell number on file:    Telephone Information:   Mobile [phone]

## 2023-12-28 DIAGNOSIS — I48.0 PAROXYSMAL ATRIAL FIBRILLATION (H): ICD-10-CM

## 2024-02-19 DIAGNOSIS — I48.0 PAROXYSMAL ATRIAL FIBRILLATION (H): ICD-10-CM

## 2024-02-19 RX ORDER — RIVAROXABAN 20 MG/1
TABLET, FILM COATED ORAL
Qty: 90 TABLET | Refills: 3 | OUTPATIENT
Start: 2024-02-19

## 2024-02-21 ENCOUNTER — VIRTUAL VISIT (OUTPATIENT)
Dept: INTERNAL MEDICINE | Facility: CLINIC | Age: 84
End: 2024-02-21
Payer: MEDICARE

## 2024-02-21 ENCOUNTER — ORDERS ONLY (AUTO-RELEASED) (OUTPATIENT)
Dept: INTERNAL MEDICINE | Facility: CLINIC | Age: 84
End: 2024-02-21

## 2024-02-21 DIAGNOSIS — G25.81 RESTLESS LEGS SYNDROME (RLS): ICD-10-CM

## 2024-02-21 DIAGNOSIS — Z12.5 PROSTATE CANCER SCREENING: ICD-10-CM

## 2024-02-21 DIAGNOSIS — G20.B1 PARKINSON'S DISEASE WITH DYSKINESIA WITHOUT FLUCTUATING MANIFESTATIONS (H): ICD-10-CM

## 2024-02-21 DIAGNOSIS — I48.0 PAROXYSMAL ATRIAL FIBRILLATION (H): Primary | ICD-10-CM

## 2024-02-21 DIAGNOSIS — E78.00 PURE HYPERCHOLESTEROLEMIA: ICD-10-CM

## 2024-02-21 DIAGNOSIS — Z86.0100 HISTORY OF COLONIC POLYPS: ICD-10-CM

## 2024-02-21 DIAGNOSIS — Z12.11 SCREEN FOR COLON CANCER: ICD-10-CM

## 2024-02-21 DIAGNOSIS — Z00.00 PREVENTATIVE HEALTH CARE: ICD-10-CM

## 2024-02-21 DIAGNOSIS — I25.10 ATHEROSCLEROSIS OF NATIVE CORONARY ARTERY OF NATIVE HEART WITHOUT ANGINA PECTORIS: ICD-10-CM

## 2024-02-21 DIAGNOSIS — Z95.0 CARDIAC PACEMAKER IN SITU: ICD-10-CM

## 2024-02-21 PROCEDURE — 99443 PR PHYSICIAN TELEPHONE EVALUATION 21-30 MIN: CPT | Mod: 93 | Performed by: INTERNAL MEDICINE

## 2024-02-21 RX ORDER — GABAPENTIN 300 MG/1
300 CAPSULE ORAL
COMMUNITY
Start: 2023-06-28 | End: 2024-02-21

## 2024-02-21 RX ORDER — CARBIDOPA/LEVODOPA 10MG-100MG
TABLET ORAL
COMMUNITY
Start: 2024-01-24 | End: 2024-02-21

## 2024-02-21 RX ORDER — GABAPENTIN 300 MG/1
300 CAPSULE ORAL 2 TIMES DAILY
COMMUNITY
Start: 2024-02-21

## 2024-02-21 RX ORDER — CARBIDOPA/LEVODOPA 10MG-100MG
2 TABLET ORAL
COMMUNITY
Start: 2024-02-21

## 2024-02-21 RX ORDER — METOPROLOL SUCCINATE 25 MG/1
25 TABLET, EXTENDED RELEASE ORAL DAILY
Qty: 90 TABLET | Refills: 3 | Status: SHIPPED | OUTPATIENT
Start: 2024-02-21 | End: 2025-02-20

## 2024-02-21 RX ORDER — METOPROLOL SUCCINATE 25 MG/1
1 TABLET, EXTENDED RELEASE ORAL DAILY
COMMUNITY
Start: 2023-05-24 | End: 2024-02-21

## 2024-02-21 RX ORDER — ROPINIROLE 0.5 MG/1
2.5 TABLET, FILM COATED ORAL AT BEDTIME
COMMUNITY
Start: 2024-02-21

## 2024-02-21 NOTE — PROGRESS NOTES
Dwight is a 83 year old who is being evaluated via a billable telephone visit.      What phone number would you like to be contacted at? [phone]  How would you like to obtain your AVS? MyChart  {PROVIDER LOCATION On-site should be selected for visits conducted from your clinic location or adjoining [facility] hospital, academic office, or other nearby [facility] building. Off-site should be selected for all other provider locations, including home:019229}  Distant Location (provider location):  On-site    {PROVIDER CHARTING PREFERENCE:538532}    Subjective   Dwight is a 83 year old, presenting for the following health issues:  Recheck Medication      2/21/2024     8:46 AM   Additional Questions   Roomed by Madison FERREIRA     History of Present Illness       Reason for visit:  Medication check    He eats 4 or more servings of fruits and vegetables daily.He consumes 0 sweetened beverage(s) daily.He exercises with enough effort to increase his heart rate 9 or less minutes per day.  He exercises with enough effort to increase his heart rate 3 or less days per week.   He is taking medications regularly.       {SUPERLIST (Optional):439059}  {additonal problems for provider to add (Optional):964493}    {ROS Picklists (Optional):623609}      Objective    Vitals - Patient Reported  Systolic (Patient Reported):  (dudley not monitor)      Vitals:  No vitals were obtained today due to virtual visit.    Physical Exam   General: Alert and no distress //Respiratory: No audible wheeze, cough, or shortness of breath // Psychiatric:  Appropriate affect, tone, and pace of words      {Diagnostic Test Results (Optional):147570}      Phone call duration: *** minutes  Signed Electronically by: Sherman Avelar MD  {Email feedback regarding this note to [email or web address]   :[id number]}

## 2024-02-21 NOTE — PATIENT INSTRUCTIONS
Refill Xarelto    Refill metoprolol    Medicine list clarified    Trial holding simvastatin and monitor leg cramps and restless legs    Update labs    Cologuard    PSA per request    Vaccinations reviewed

## 2024-02-21 NOTE — PROGRESS NOTES
Dwight is a 83 year old male being evaluated via a billable phone visit, and would like to be contacted via the following  Home number [phone] (home)    ASSESSMENT and PLAN:  1. Paroxysmal atrial fibrillation (H)  Refill Xarelto.  Pacemaker noted.  Refill metoprolol which replaced sotalol  - rivaroxaban ANTICOAGULANT (XARELTO) 20 MG TABS tablet; Take 1 tablet (20 mg) by mouth daily (with dinner)  Dispense: 90 tablet; Refill: 3  - metoprolol succinate ER (TOPROL XL) 25 MG 24 hr tablet; Take 1 tablet (25 mg) by mouth daily  Dispense: 90 tablet; Refill: 3    2. Restless legs syndrome (RLS)  Medicines noted through neurology.  Trial off simvastatin  - gabapentin (NEURONTIN) 300 MG capsule; Take 1 capsule (300 mg) by mouth 2 times daily  - rOPINIRole (REQUIP) 0.5 MG tablet; Take 5 tablets (2.5 mg) by mouth at bedtime  - Basic metabolic panel; Future    3. Screen for colon cancer  2012 colonoscopy showing polyps with 2017 Cologuard normal.  Okay to repeat Cologuard per request    4. Pure hypercholesterolemia  Trial without simvastatin  - Lipid Profile; Future    5. 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 Cardiac pacemaker in situ  Noted and thus relatively certain of minimal atrial fibrillation burden    7. Atherosclerosis of native coronary artery of native heart without angina pectoris  Stable    8. Preventative health care  Reviewed.  Has living well.  Reviewed vaccinations.  Would like to to continue prostate cancer screening  - REVIEW OF HEALTH MAINTENANCE PROTOCOL ORDERS    9. History of colonic polyps  - COLOGUARD(EXACT SCIENCES); Future    10. Prostate cancer screening  - Prostate Specific Antigen Screen; Future       Patient Instructions   Refill Xarelto    Refill metoprolol    Medicine list clarified    Trial holding simvastatin and monitor leg cramps and restless legs    Update labs    Cologuard    PSA per request    Vaccinations reviewed        "     Return in about 1 year (around 2/21/2025) for using a video visit.         CHIEF COMPLAINT:  Chief Complaint   Patient presents with    Recheck Medication           2/21/2024     8:46 AM   Additional Questions   Roomed by Madison FERREIRA       HISTORY OF PRESENT ILLNESS:  Dwight is a 83 year old male contacting the clinic today via phone for review of medication.  Takes Xarelto for paroxysmal atrial fibrillation.  Has pacemaker so burden known and quite low    Sotalol changed to metoprolol.  Tolerating this well    Severe restless legs for which she takes pramipexole and gabapentin in addition to Sinemet.  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  Anxiety seems to be under better control    Preventive care reviewed.  Colon polyps noted 2012.  Would like to continue monitoring this and prostate    History of Present Illness       Reason for visit:  Medication check    He eats 4 or more servings of fruits and vegetables daily.He consumes 0 sweetened beverage(s) daily.He exercises with enough effort to increase his heart rate 9 or less minutes per day.  He exercises with enough effort to increase his heart rate 3 or less days per week.   He is taking medications regularly.      REVIEW OF SYSTEMS:  No peripheral edema    PFSH:  Social History     Social History Narrative     to wife Karin    3 boys    Retired     Likes to be physically active    Active in [employer]     Caregiver of wife and, living independently    TOBACCO USE:  History   Smoking Status    Never   Smokeless Tobacco    Not on file       VITALS:  There were no vitals filed for this visit.  There were no vitals taken for this visit. Estimated body mass index is 27.13 kg/m  as calculated from the following:    Height as of 10/3/22: 1.816 m (5' 11.5\").    Weight as of 10/3/22: 89.5 kg (197 lb 4.8 oz).    PHYSICAL EXAM:  (observations " "via Phone)  Alert and oriented    MEDICATIONS  Current Outpatient Medications   Medication Sig Dispense Refill    carbidopa-levodopa (SINEMET)  MG tablet Take 2 tablets by mouth 5 times daily      gabapentin (NEURONTIN) 300 MG capsule Take 1 capsule (300 mg) by mouth 2 times daily      metoprolol succinate ER (TOPROL XL) 25 MG 24 hr tablet Take 1 tablet (25 mg) by mouth daily 90 tablet 3    rivaroxaban ANTICOAGULANT (XARELTO) 20 MG TABS tablet Take 1 tablet (20 mg) by mouth daily (with dinner) 90 tablet 3    rOPINIRole (REQUIP) 0.5 MG tablet Take 5 tablets (2.5 mg) by mouth at bedtime      simvastatin (ZOCOR) 10 MG tablet TAKE 1 TABLET(10 MG) BY MOUTH AT BEDTIME 90 tablet 3       Notes summarized:   Labs, x-rays, cardiology, GI tests reviewed: Ordered  Recent Labs   Lab Test 03/16/23  1032   HGB 12.9*   WBC 4.6      POTASSIUM 4.5   CR 1.21*   B12 399   TSH 3.16     No results found for: \"[id number]\"  Lab Results   Component Value Date    CHOL 133 03/16/2023     New orders:   Orders Placed This Encounter   Procedures    REVIEW OF HEALTH MAINTENANCE PROTOCOL ORDERS    Lipid Profile    Basic metabolic panel    Prostate Specific Antigen Screen    COLOGUARD([employer]ACT SCI[employer]ES)       Independent review of:    Patient would like to receive their AVS by Kavon Avelar MD  [facility]    Phone-Visit Details  Type of service:  Phone Visit  Patient has given verbal consent to a Phone visit?  Yes  How would you like to obtain your AVS?  Génesishardiane  Will anyone else be joining your phone visit, giving supplemental history? No    Originating location (pt location): Home    Distant Location (provider location):  Off-site    Phone Start Time: 11:31 AM  Phone End time:  12:01 PM  Conversation plus orders:  30 minutes  Dictation time:  3 minutes    The visit lasted a total of 33 minutes     "

## 2024-03-20 LAB — NONINV COLON CA DNA+OCC BLD SCRN STL QL: NEGATIVE

## 2024-03-27 ENCOUNTER — LAB (OUTPATIENT)
Dept: LAB | Facility: CLINIC | Age: 84
End: 2024-03-27
Payer: MEDICARE

## 2024-03-27 DIAGNOSIS — G25.81 RESTLESS LEGS SYNDROME (RLS): ICD-10-CM

## 2024-03-27 DIAGNOSIS — E78.00 PURE HYPERCHOLESTEROLEMIA: ICD-10-CM

## 2024-03-27 DIAGNOSIS — Z12.5 PROSTATE CANCER SCREENING: ICD-10-CM

## 2024-03-27 LAB
ANION GAP SERPL CALCULATED.3IONS-SCNC: 10 MMOL/L (ref 7–15)
BUN SERPL-MCNC: 29.4 MG/DL (ref 8–23)
CALCIUM SERPL-MCNC: 8.9 MG/DL (ref 8.8–10.2)
CHLORIDE SERPL-SCNC: 107 MMOL/L (ref 98–107)
CHOLEST SERPL-MCNC: 108 MG/DL
CREAT SERPL-MCNC: 1.46 MG/DL (ref 0.67–1.17)
DEPRECATED HCO3 PLAS-SCNC: 23 MMOL/L (ref 22–29)
EGFRCR SERPLBLD CKD-EPI 2021: 47 ML/MIN/1.73M2
FASTING STATUS PATIENT QL REPORTED: NO
GLUCOSE SERPL-MCNC: 84 MG/DL (ref 70–99)
HDLC SERPL-MCNC: 60 MG/DL
LDLC SERPL CALC-MCNC: 38 MG/DL
NONHDLC SERPL-MCNC: 48 MG/DL
POTASSIUM SERPL-SCNC: 4.8 MMOL/L (ref 3.4–5.3)
PSA SERPL DL<=0.01 NG/ML-MCNC: 0.96 NG/ML
SODIUM SERPL-SCNC: 140 MMOL/L (ref 135–145)
TRIGL SERPL-MCNC: 51 MG/DL

## 2024-03-27 PROCEDURE — G0103 PSA SCREENING: HCPCS

## 2024-03-27 PROCEDURE — 36415 COLL VENOUS BLD VENIPUNCTURE: CPT

## 2024-03-27 PROCEDURE — 80048 BASIC METABOLIC PNL TOTAL CA: CPT

## 2024-03-27 PROCEDURE — 80061 LIPID PANEL: CPT

## 2024-04-23 ENCOUNTER — TELEPHONE (OUTPATIENT)
Dept: ANTICOAGULATION | Facility: CLINIC | Age: 84
End: 2024-04-23
Payer: MEDICARE

## 2024-04-23 DIAGNOSIS — I48.0 PAROXYSMAL ATRIAL FIBRILLATION (H): ICD-10-CM

## 2024-04-23 NOTE — TELEPHONE ENCOUNTER
ANTICOAGULATION DIRECT ORAL ANTICOAGULANT MONITORING    SUBJECTIVE     The [facility] is evaluating Dwight Boyer's Rivaroxaban (Xarelto) as part of its Anticoagulation Monitoring Program.    Indication:Atrial Fibrillation  Current dose per medication list: Rivaroxaban 20 mg Daily  Recent hospitalizations/ED/Office Visits for bleeding/clotting concerns: No  Other bleeding or side effect concerns: No  Additional findings: CAD, pacemaker,parkinson's severe restless legs for which he takes pramipexole and gabapentin in addition to Sinemet.       OBJECTIVE     Age: 83 year old    Wt Readings from Last 2 Encounters:   10/03/22 89.5 kg (197 lb 4.8 oz)   10/10/17 87.4 kg (192 lb 11.2 oz)      Lab Results   Component Value Date    CR 1.46 (H) 03/27/2024    CR 1.21 (H) 03/16/2023     Creatinine Clearance (using actual bodyweight, mL/min): 47    Lab Results   Component Value Date    HGB 12.9 03/16/2023     03/16/2023     ASSESSMENT/PLAN     A chart review for Direct Oral Anticoagulant (DOAC) Stewardship has been completed for:     Dosing: recommend adjustment to Rivaroxaban 15 mg Daily for CrCl <= 50 mL/min (using actual bodyweight) (consistent with package insert dosing)    Plan made per [facility] anticoagulation protocol    Loli Riojas RN  Anticoagulation Clinic

## 2024-04-23 NOTE — TELEPHONE ENCOUNTER
ANTICOAGULATION  STEWARDSHIP    Spoke with Dwight to review advised dosage change for Rivaroxaban (Xarelto).    Education provided: may finish the supply of 20 mg tablets that you have at home before starting the new dose. , not discontinue therapy without talking to their provider first, and Dwight does plan to review plans for pacemaker battery change and need to continue anticoag therapy at next ov.    They verbalized understanding of new Rivaroxaban (Xarelto) dose/tablet strength    Loli Riojas RN  [facility]

## 2024-05-06 ENCOUNTER — TRANSFERRED RECORDS (OUTPATIENT)
Dept: HEALTH INFORMATION MANAGEMENT | Facility: CLINIC | Age: 84
End: 2024-05-06
Payer: MEDICARE

## 2024-05-29 ENCOUNTER — TRANSFERRED RECORDS (OUTPATIENT)
Dept: HEALTH INFORMATION MANAGEMENT | Facility: CLINIC | Age: 84
End: 2024-05-29
Payer: MEDICARE

## 2024-06-03 NOTE — PROGRESS NOTES
ASSESSMENT:  1. Atherosclerosis of native coronary artery of native heart without angina pectoris  Stable after stenting 10 years ago.  Continue statin.  He has stopped aspirin.  Blood pressure excellent    2. Parkinson's Disease  Stable on Sinemet.  Sees neurology yearly.  Update labs  - Comprehensive Metabolic Panel  - Vitamin B12  - HM2(CBC w/o Differential)    3. Restless legs syndrome (RLS)  Addition of Requip has helped symptoms dramatically in addition to Sinemet.    4. Healthcare maintenance  Last colonoscopy showed polyps.  Despite this, given his comorbidities, follow-up [place] guard seems reasonable.  Check Prevnar.  He refuses flu shots  - Pneumococcal conjugate vaccine 13-valent 6wks-17yrs; >50yrs  - Cologuard    5.  Atrial flutter.  With intermittent atrial fibrillation.  Medicine list updated.  Continue sotalol and Xarelto.  Refill meds.  Asymptomatic    PLAN:  Patient Instructions   Refill simvastatin    Refill Xarelto and Sotalol    Update blood tests    cologuard    prevnar pneumonia booster          Orders Placed This Encounter   Procedures     Pneumococcal conjugate vaccine 13-valent 6wks-17yrs; >50yrs     Comprehensive Metabolic Panel     Vitamin B12     HM2(CBC w/o Differential)     Lipid Cascade     Order Specific Question:   Fasting is required?     Answer:   Unknown     Cologuard     Medications Discontinued During This Encounter   Medication Reason     metoprolol succinate (TOPROL XL) 50 MG 24 hr tablet      diltiazem (CARDIZEM CD) 180 MG 24 hr capsule      simvastatin (ZOCOR) 10 MG tablet Reorder     sotalol (BETAPACE) 80 MG tablet Reorder     rivaroxaban (XARELTO) 20 mg Tab Therapy completed     rivaroxaban (XARELTO) 20 mg Tab Reorder     sotalol (BETAPACE) 80 MG tablet Reorder       Return in about 1 year (around 10/10/2018).    CHIEF COMPLAINT:  Chief Complaint   Patient presents with     Follow Up     med check        HISTORY OF PRESENT ILLNESS:  Dwight is a 76 y.o. male presenting to  "the clinic today for a medication check.     CAD: He does not know whether he should be taking simvastatin or not. He has not had it for the past month or so. He believes his cholesterol levels have been in a good range, but he has a history of stent placement about ten years ago. He was not having any side effects to the medication, so he is fine with starting it again. He does not see a cardiologist very often and does not know when his next appointment is.     Atrial Fibrillation: He is in A-fib about 18% of the time, but he is not symptomatic. He has a pacemaker. He is taking 40 mg of sotalol twice daily and 20 mg of Xarelto daily. He has not had any bleeding from Xarelto. He underwent an ablation last year, which was at least his second one. He thinks he might have had three.     Parkinson's Disease: He is taking two pills of carbidopa-levodopa three times per day, which seems to work well. He develops restless legs every night, which he attributes to his Parkinson's, but he takes ropinirole, which has worked very welll. He does stretches before bed as well. Overall, he is happy with his medications.     Health Maintenance: He does not want a flu shot, but he will get the second pneumonia vaccine. His last colonoscopy was in 2010, and he is due for another one. He is interested in doing Cologuard as an alternative.     REVIEW OF SYSTEMS:   He does not take a daily aspirin, metoprolol, or diltiazem. He occasionally gets more fatigued than he thinks he should. All other systems are negative.    PFSH:  He exercises regularly.     TOBACCO USE:  History   Smoking Status     Never Smoker   Smokeless Tobacco     Not on file       VITALS:  Vitals:    10/10/17 1442   BP: 116/72   Patient Site: Left Arm   Patient Position: Sitting   Cuff Size: Adult Regular   Pulse: 68   Weight: 192 lb 11.2 oz (87.4 kg)   Height: 5' 10.5\" (1.791 m)     Wt Readings from Last 3 Encounters:   10/10/17 192 lb 11.2 oz (87.4 kg)   05/26/16 193 " lb (87.5 kg)   02/05/15 184 lb 11.9 oz (83.8 kg)     Body mass index is 27.26 kg/(m^2).    PHYSICAL EXAM:  Constitutional:  Reveals an alert, pleasant, talkative male.  Vitals:  Per nursing notes.  Cardiac:  Regular rate and rhythm without murmurs, rubs, or gallops.Palpation of the radial pulse regular.  Lungs: Clear.  Respiratory effort normal.  Neurologic:  Cranial nerves II-XII intact.     Psychiatric:  Mood appropriate, memory intact.     ADDITIONAL HISTORY SUMMARIZED (2): Reviewed 2010 colonoscopy report regarding polyp and clearance.   DECISION TO OBTAIN EXTRA INFORMATION (1): Care Everywhere accessed.   RADIOLOGY TESTS (1): None.  LABS (1): Reviewed June 2016 labs from [facility]. Labs ordered.   MEDICINE TESTS (1): Reviewed echo from [facility]  INDEPENDENT REVIEW (2 each): None.     The visit lasted a total of 25 minutes face to face with the patient. Over 50% of the time was spent counseling and educating the patient about his medications.    IEdouard, am scribing for and in the presence of, Dr. Avelar.    I, Dr. Avelar, personally performed the services described in this documentation, as scribed by Edouard Morgan in my presence, and it is both accurate and complete.    MEDICATIONS:  Current Outpatient Prescriptions   Medication Sig Dispense Refill     carbidopa-levodopa (SINEMET CR)  mg ER tablet Take 2 tablets by mouth 3 (three) times a day.       doxycycline (PERIOSTAT) 20 MG tablet TAKE 1 TABLET BY MOUTH EVERY DAY 90 tablet 0     rivaroxaban (XARELTO) 20 mg Tab Take 1 tablet (20 mg total) by mouth daily with supper. 90 tablet 3     rOPINIRole (REQUIP) 0.5 MG tablet Take 0.5-1 mg by mouth.       simvastatin (ZOCOR) 10 MG tablet TAKE 1 TABLET AT BEDTIME-Due for Physical & fasting Labs. Please Schedule. 90 tablet 3     sotalol (BETAPACE) 80 MG tablet Take 0.5 tablets (40 mg total) by mouth 2 (two) times a day. 90 tablet 3     aspirin 81 mg chewable tablet Chew 81 mg daily.       No current  facility-administered medications for this visit.        Total data points: 5         good, to achieve stated therapy goals

## 2024-06-12 ENCOUNTER — TRANSFERRED RECORDS (OUTPATIENT)
Dept: HEALTH INFORMATION MANAGEMENT | Facility: CLINIC | Age: 84
End: 2024-06-12
Payer: MEDICARE

## 2024-07-19 ENCOUNTER — TRANSFERRED RECORDS (OUTPATIENT)
Dept: HEALTH INFORMATION MANAGEMENT | Facility: CLINIC | Age: 84
End: 2024-07-19
Payer: COMMERCIAL

## 2024-09-04 ENCOUNTER — TRANSFERRED RECORDS (OUTPATIENT)
Dept: HEALTH INFORMATION MANAGEMENT | Facility: CLINIC | Age: 84
End: 2024-09-04
Payer: MEDICARE

## 2024-09-25 DIAGNOSIS — E78.00 PURE HYPERCHOLESTEROLEMIA: Primary | ICD-10-CM

## 2024-09-25 RX ORDER — SIMVASTATIN 10 MG
10 TABLET ORAL AT BEDTIME
Qty: 90 TABLET | Refills: 3 | Status: SHIPPED | OUTPATIENT
Start: 2024-09-25

## 2024-12-08 ENCOUNTER — HEALTH MAINTENANCE LETTER (OUTPATIENT)
Age: 84
End: 2024-12-08

## 2024-12-19 DIAGNOSIS — I48.0 PAROXYSMAL ATRIAL FIBRILLATION (H): ICD-10-CM

## 2025-01-03 ENCOUNTER — TRANSFERRED RECORDS (OUTPATIENT)
Dept: HEALTH INFORMATION MANAGEMENT | Facility: CLINIC | Age: 85
End: 2025-01-03
Payer: MEDICARE

## 2025-02-11 ENCOUNTER — OFFICE VISIT (OUTPATIENT)
Dept: INTERNAL MEDICINE | Facility: CLINIC | Age: 85
End: 2025-02-11
Payer: MEDICARE

## 2025-02-11 VITALS
WEIGHT: 202 LBS | HEIGHT: 72 IN | DIASTOLIC BLOOD PRESSURE: 76 MMHG | TEMPERATURE: 97.3 F | SYSTOLIC BLOOD PRESSURE: 115 MMHG | BODY MASS INDEX: 27.36 KG/M2 | HEART RATE: 74 BPM | RESPIRATION RATE: 15 BRPM | OXYGEN SATURATION: 100 %

## 2025-02-11 DIAGNOSIS — G20.B1 PARKINSON'S DISEASE WITH DYSKINESIA WITHOUT FLUCTUATING MANIFESTATIONS (H): ICD-10-CM

## 2025-02-11 DIAGNOSIS — N18.31 CHRONIC KIDNEY DISEASE, STAGE 3A (H): ICD-10-CM

## 2025-02-11 DIAGNOSIS — D64.9 ANEMIA, UNSPECIFIED TYPE: ICD-10-CM

## 2025-02-11 DIAGNOSIS — Z01.818 PRE-OPERATIVE EXAMINATION FOR INTERNAL MEDICINE: Primary | ICD-10-CM

## 2025-02-11 DIAGNOSIS — R25.9 UNSPECIFIED ABNORMAL INVOLUNTARY MOVEMENTS: ICD-10-CM

## 2025-02-11 DIAGNOSIS — E78.00 PURE HYPERCHOLESTEROLEMIA: ICD-10-CM

## 2025-02-11 DIAGNOSIS — Z95.0 CARDIAC PACEMAKER IN SITU: ICD-10-CM

## 2025-02-11 DIAGNOSIS — G25.81 RESTLESS LEGS SYNDROME (RLS): Primary | ICD-10-CM

## 2025-02-11 DIAGNOSIS — G25.81 RESTLESS LEGS SYNDROME (RLS): ICD-10-CM

## 2025-02-11 DIAGNOSIS — I48.3 TYPICAL ATRIAL FLUTTER (H): ICD-10-CM

## 2025-02-11 LAB
ALBUMIN SERPL BCG-MCNC: 4.4 G/DL (ref 3.5–5.2)
ALP SERPL-CCNC: 81 U/L (ref 40–150)
ALT SERPL W P-5'-P-CCNC: 18 U/L (ref 0–70)
ANION GAP SERPL CALCULATED.3IONS-SCNC: 12 MMOL/L (ref 7–15)
AST SERPL W P-5'-P-CCNC: 33 U/L (ref 0–45)
BILIRUB SERPL-MCNC: 1.1 MG/DL
BUN SERPL-MCNC: 26.1 MG/DL (ref 8–23)
CALCIUM SERPL-MCNC: 9.5 MG/DL (ref 8.8–10.4)
CHLORIDE SERPL-SCNC: 106 MMOL/L (ref 98–107)
CHOLEST SERPL-MCNC: 119 MG/DL
CREAT SERPL-MCNC: 1.17 MG/DL (ref 0.67–1.17)
EGFRCR SERPLBLD CKD-EPI 2021: 61 ML/MIN/1.73M2
ERYTHROCYTE [DISTWIDTH] IN BLOOD BY AUTOMATED COUNT: 12.7 % (ref 10–15)
FASTING STATUS PATIENT QL REPORTED: ABNORMAL
FASTING STATUS PATIENT QL REPORTED: NORMAL
FERRITIN SERPL-MCNC: 393 NG/ML (ref 31–409)
GLUCOSE SERPL-MCNC: 108 MG/DL (ref 70–99)
HCO3 SERPL-SCNC: 23 MMOL/L (ref 22–29)
HCT VFR BLD AUTO: 39 % (ref 40–53)
HDLC SERPL-MCNC: 60 MG/DL
HGB BLD-MCNC: 12.4 G/DL (ref 13.3–17.7)
LDLC SERPL CALC-MCNC: 39 MG/DL
MCH RBC QN AUTO: 32.5 PG (ref 26.5–33)
MCHC RBC AUTO-ENTMCNC: 31.8 G/DL (ref 31.5–36.5)
MCV RBC AUTO: 102 FL (ref 78–100)
NONHDLC SERPL-MCNC: 59 MG/DL
PLATELET # BLD AUTO: 140 10E3/UL (ref 150–450)
POTASSIUM SERPL-SCNC: 4.5 MMOL/L (ref 3.4–5.3)
PROT SERPL-MCNC: 7.3 G/DL (ref 6.4–8.3)
RBC # BLD AUTO: 3.81 10E6/UL (ref 4.4–5.9)
SODIUM SERPL-SCNC: 141 MMOL/L (ref 135–145)
TRIGL SERPL-MCNC: 102 MG/DL
TSH SERPL DL<=0.005 MIU/L-ACNC: 2.52 UIU/ML (ref 0.3–4.2)
VIT B12 SERPL-MCNC: 445 PG/ML (ref 232–1245)
WBC # BLD AUTO: 5.2 10E3/UL (ref 4–11)

## 2025-02-11 PROCEDURE — 99214 OFFICE O/P EST MOD 30 MIN: CPT | Performed by: INTERNAL MEDICINE

## 2025-02-11 PROCEDURE — 82607 VITAMIN B-12: CPT | Performed by: INTERNAL MEDICINE

## 2025-02-11 PROCEDURE — 36415 COLL VENOUS BLD VENIPUNCTURE: CPT | Performed by: INTERNAL MEDICINE

## 2025-02-11 PROCEDURE — 82728 ASSAY OF FERRITIN: CPT | Performed by: INTERNAL MEDICINE

## 2025-02-11 PROCEDURE — 85027 COMPLETE CBC AUTOMATED: CPT | Performed by: INTERNAL MEDICINE

## 2025-02-11 PROCEDURE — 84443 ASSAY THYROID STIM HORMONE: CPT | Performed by: INTERNAL MEDICINE

## 2025-02-11 PROCEDURE — 80061 LIPID PANEL: CPT | Performed by: INTERNAL MEDICINE

## 2025-02-11 PROCEDURE — 80053 COMPREHEN METABOLIC PANEL: CPT | Performed by: INTERNAL MEDICINE

## 2025-02-11 PROCEDURE — G2211 COMPLEX E/M VISIT ADD ON: HCPCS | Performed by: INTERNAL MEDICINE

## 2025-02-11 RX ORDER — SOTALOL HYDROCHLORIDE 80 MG/1
40 TABLET ORAL EVERY 24 HOURS
COMMUNITY
Start: 2024-08-30

## 2025-02-11 RX ORDER — TAMSULOSIN HYDROCHLORIDE 0.4 MG/1
CAPSULE ORAL
COMMUNITY
End: 2025-02-11

## 2025-02-11 ASSESSMENT — PAIN SCALES - GENERAL: PAINLEVEL_OUTOF10: NO PAIN (0)

## 2025-02-11 NOTE — PATIENT INSTRUCTIONS
Original pacemaker June of 2015    Usually we hold blood thinners like xarelto 72 hours before a procedure, and resume the evening of the procedure    Morning of procedure take:  Sotalol  Normal morning meds    Update blood work    Add on thyroid and iron for restless legs    Hold simvastatin for 1 week and see if restless legs improve.  If not resume

## 2025-02-11 NOTE — PROGRESS NOTES
PREOPERATIVE EVALUATION:  Today's date: 2/11/2025  [facility]  [address] [address]  Phone: [phone]  Fax: [phone]  Primary Provider: Sherman Avelar MD  Pre-op Performing Provider: Sherman Avelar MD          2/11/2025   Surgical Information   What procedure is being done? pacemaker replacement    Facility or Hospital where procedure/surgery will be performed: [facility]    Who is doing the procedure / surgery? unknown    Date of surgery / procedure: 2/27/25    Time of surgery / procedure: TBD    Where do you plan to recover after surgery? at home with family        Proxy-reported     Fax number for surgical facility: Note does not need to be faxed, will be available electronically in Epic.    Type of Anesthesia Anticipated: General        2/11/2025     2:44 PM   Additional Questions   Roomed by veronica ibarra       Assessment/Plan:     ASSESSMENT and PLAN:    1. Pre-operative examination for internal medicine (Primary)  Stable for surgery  - REVIEW OF HEALTH MAINTENANCE PROTOCOL ORDERS    2. Cardiac pacemaker in situ  Stable for surgery    3. Typical atrial flutter (H)  Unclear.  Do not obtain pacemaker reports    4. Chronic kidney disease, stage 3a (H)  - Comprehensive metabolic panel; Future  - TSH; Future  - Ferritin; Future  - Comprehensive metabolic panel  - TSH  - Ferritin    5. 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 Pure hypercholesterolemia  - Lipid Profile; Future  - Lipid Profile    7. Restless legs syndrome (RLS)  Update labs.  Rule out iron or thyroid problem.  Trial without statin  - TSH; Future  - Ferritin; Future  - TSH  - Ferritin  - Vitamin B12    8. Unspecified abnormal  involuntary movements  - TSH; Future  - TSH    9. Anemia, unspecified type  Macrocytic anemia noted.  Rule out B12 deficiency who  - Vitamin B12       Patient Instructions   Original pacemaker June of 2015    Usually we hold blood thinners like xarelto 72 hours before a procedure, and resume the evening of the procedure    Morning of procedure take:  Sotalol  Normal morning meds    Update blood work    Add on thyroid and iron for restless legs    Hold simvastatin for 1 week and see if restless legs improve.  If not resume            Return in about 1 year (around 2/11/2026) for in person.       Subjective     Dwight Byoer is a 84 year old male who presents for a preoperative evaluation.    HPI: Underwent pacemaker for bradycardia 2015.  Due for battery change out.  No syncope    Complains of severe restless legs and Parkinson's.  Discussed ruling out iron deficiency or thyroid problems    Hemoglobin returned slightly low with increasing MCV.  Rule out pernicious anemia        2/11/2025     2:52 PM   Preop Questions   Have you ever had a heart attack or stroke? No   Have you ever had surgery on your heart or blood vessels, such as a stent placement, a coronary artery bypass, or surgery on an artery in your head, neck, heart, or legs? Yes   Do you have chest pain with activity? No   Do you have a history of heart failure? No   Do you currently have a cold, bronchitis or symptoms of other infection? No   Do you have a cough, shortness of breath, or wheezing? No   Do you or anyone in your family have previous history of blood clots? No   Do you or does anyone in your family have a serious bleeding problem such as prolonged bleeding following surgeries or cuts? No   Have you ever had problems with anemia or been told to take iron pills? No   Have you had any abnormal blood loss such as black, tarry or bloody stools? No   Have you ever had a blood transfusion? No   Are you willing to have a blood transfusion if it is  medically needed before, during, or after your surgery? Yes   Have you or any of your relatives ever had problems with anesthesia? No   Do you have sleep apnea, excessive snoring or daytime drowsiness? No   Do you have any artifical heart valves or other implanted medical devices like a pacemaker, defibrillator, or continuous glucose monitor? Yes   What type of device do you have? [employer] pacemaker   Name of the clinic that manages your device [facility]   Do you have artificial joints? No   Are you allergic to latex? No       Further interval history and review of symptoms: Continues to exercise regularly.  No chest pain or shortness of breath.  Severe restless legs at night.  Stable Parkinson's    Today's PHQ-2 Score:       2/11/2025     2:55 PM   PHQ-2 ( 1999 [employer])   Q1: Little interest or pleasure in doing things 0   Q2: Feeling down, depressed or hopeless 0   PHQ-2 Score 0       Health Care Directive:  Patient does not have a Health Care Directive: Advance Directive received and scanned. Click on Code in the patient header to view.    Preoperative Review of :   reviewed - controlled substances reflected in medication list.    Patient Active Problem List    Diagnosis Date Noted    Paroxysmal atrial fibrillation (H) 09/10/2020     Priority: Medium    Abnormal involuntary movement      Priority: Medium     Created by Conversion        Atherosclerosis of native coronary artery of native heart without angina pectoris 10/10/2017     Priority: Medium    Restless legs syndrome (RLS) 10/10/2017     Priority: Medium    Diverticulosis      Priority: Medium     Created by Conversion  Replacement Utility updated for latest IMO load        Cardiac pacemaker in situ 06/12/2015     Priority: Medium    Parkinson's disease with dyskinesia without fluctuating manifestations (H) 01/27/2015     Priority: Medium    Neuritis      Priority: Medium     Created by Conversion        Microscopic Hematuria      Priority: Medium      Created by Conversion        Rosacea      Priority: Medium     Created by Conversion        Hyperglycemia      Priority: Medium     Created by Conversion        Vocal Cord Disorder      Priority: Medium     Created by Conversion        Typical atrial flutter (H) 01/05/2014     Priority: Medium    Chronic ischemic heart disease 01/05/2014     Priority: Medium    CAD (coronary artery disease) 01/05/2014     Priority: Medium      Past Medical History:   Diagnosis Date    Atrial fibrillation (H)     Coronary artery disease     Hyperlipidemia     pt denies    Inguinal hernia     arvin    Parkinson disease (H)     Rosacea      Past Surgical History:   Procedure Laterality Date    APPENDECTOMY      ARTHROSCOPY KNEE Left     CORONARY STENT PLACEMENT  2007     KNEE SCOPE, DIAGNOSTIC      Description: Arthroscopy Knee Right;  Recorded: 09/09/2008;    OTHER SURGICAL HISTORY      CORONARY ABLATION    ZZC APPENDECTOMY      Description: Appendectomy;  Recorded: 09/09/2008;     Allergies   Allergen Reactions    No Known Drug Allergy Unknown     Current Outpatient Medications   Medication Sig Dispense Refill    carbidopa-levodopa (SINEMET)  MG tablet Take 2 tablets by mouth 5 times daily      gabapentin (NEURONTIN) 300 MG capsule Take 1 capsule (300 mg) by mouth 2 times daily      metoprolol succinate ER (TOPROL XL) 25 MG 24 hr tablet Take 1 tablet (25 mg) by mouth daily 90 tablet 3    rivaroxaban ANTICOAGULANT (XARELTO) 15 MG TABS tablet Take 1 tablet (15 mg) by mouth daily (with dinner). 90 tablet 2    rOPINIRole (REQUIP) 0.5 MG tablet Take 5 tablets (2.5 mg) by mouth at bedtime      simvastatin (ZOCOR) 10 MG tablet Take 1 tablet (10 mg) by mouth at bedtime. 90 tablet 3       Social History     Tobacco Use    Smoking status: Never     Passive exposure: Never    Smokeless tobacco: Not on file   Substance Use Topics    Alcohol use: Yes     Comment: Alcoholic Drinks/day: 1 BEER WEEK     History   Drug Use No          "  Objective     OBJECTIVE:     PHYSICAL EXAM:  /76 (BP Location: Left arm, Patient Position: Sitting, Cuff Size: Adult Regular)   Pulse 74   Temp 97.3  F (36.3  C)   Resp 15   Ht 1.816 m (5' 11.5\")   Wt 91.6 kg (202 lb)   SpO2 100%   BMI 27.78 kg/m     Estimated body mass index is 27.78 kg/m  as calculated from the following:    Height as of this encounter: 1.816 m (5' 11.5\").    Weight as of this encounter: 91.6 kg (202 lb).    Constitutional:  Reveals pleasant sarcastic man who ambulates methodically but without assistance  Palpation of radial pulse regular   Cardiac: Regular rate and rhythm   Lungs: Clear.  Respiratory effort normal.  Edema of Legs: Trace edema bilaterally  Psychiatric:  Alert and oriented   Mallampati score 2  Slightly jumpy legs.  Masklike facies consistent with Parkinson.  Mild resting tremor      Recent Labs   Lab Test 03/27/24  1042 03/16/23  1032   HGB  --  12.9*   PLT  --  126*    142   POTASSIUM 4.8 4.5   CR 1.46* 1.21*   PSA 0.96  --    B12  --  399   TSH  --  3.16       Lab Results   Component Value Date    CHOL 108 03/27/2024        Diagnostics:  No orders of the defined types were placed in this encounter.      No EKG required for low risk surgery (cataract, skin procedure, breast biopsy, etc).  Plans to have cardiology preop next week    Revised Cardiac Risk Index (RCRI):  The patient has the following serious cardiovascular risks for perioperative complications:   - No serious cardiac risks = 0 points     RCRI Interpretation: 0 points: Class I (very low risk - 0.4% complication rate)         Start Time: 3:14 PM  End time:   3:36 PM  Visit plus orders: 22 minutes  Dictation time:  3 minutes    The visit lasted a total of 25 minutes     Sherman Avelar MD  Internal Medicine  [facility]    The ongoing plan of care, for the conditions documented in the note above, were addressed during this visit.  Due to the chronic nature of the " issues and uncertain outcome of changes made today, I will continue to support Dwight in the ongoing management of these conditions and continuity of care by access to [employer] messages, phone calls, and follow-up appointments.

## 2025-03-13 ENCOUNTER — TRANSFERRED RECORDS (OUTPATIENT)
Dept: HEALTH INFORMATION MANAGEMENT | Facility: CLINIC | Age: 85
End: 2025-03-13
Payer: MEDICARE

## 2025-03-16 ENCOUNTER — HEALTH MAINTENANCE LETTER (OUTPATIENT)
Age: 85
End: 2025-03-16

## 2025-03-16 DIAGNOSIS — I48.0 PAROXYSMAL ATRIAL FIBRILLATION (H): ICD-10-CM

## 2025-03-17 RX ORDER — METOPROLOL SUCCINATE 25 MG/1
25 TABLET, EXTENDED RELEASE ORAL DAILY
Qty: 90 TABLET | Refills: 2 | Status: SHIPPED | OUTPATIENT
Start: 2025-03-17

## 2025-04-21 ENCOUNTER — TRANSFERRED RECORDS (OUTPATIENT)
Dept: HEALTH INFORMATION MANAGEMENT | Facility: CLINIC | Age: 85
End: 2025-04-21
Payer: MEDICARE

## 2025-07-11 ENCOUNTER — TRANSFERRED RECORDS (OUTPATIENT)
Dept: HEALTH INFORMATION MANAGEMENT | Facility: CLINIC | Age: 85
End: 2025-07-11
Payer: MEDICARE

## 2025-07-21 ENCOUNTER — TRANSFERRED RECORDS (OUTPATIENT)
Dept: HEALTH INFORMATION MANAGEMENT | Facility: CLINIC | Age: 85
End: 2025-07-21
Payer: MEDICARE